# Patient Record
Sex: MALE | Race: WHITE | NOT HISPANIC OR LATINO | Employment: OTHER | URBAN - NONMETROPOLITAN AREA
[De-identification: names, ages, dates, MRNs, and addresses within clinical notes are randomized per-mention and may not be internally consistent; named-entity substitution may affect disease eponyms.]

---

## 2021-01-14 ENCOUNTER — IMMUNIZATION (OUTPATIENT)
Dept: VACCINE CLINIC | Facility: HOSPITAL | Age: 76
End: 2021-01-14

## 2021-01-14 PROCEDURE — 91300 HC SARSCOV02 VAC 30MCG/0.3ML IM: CPT | Performed by: THORACIC SURGERY (CARDIOTHORACIC VASCULAR SURGERY)

## 2021-01-14 PROCEDURE — 0001A: CPT | Performed by: THORACIC SURGERY (CARDIOTHORACIC VASCULAR SURGERY)

## 2021-02-04 ENCOUNTER — IMMUNIZATION (OUTPATIENT)
Dept: VACCINE CLINIC | Facility: HOSPITAL | Age: 76
End: 2021-02-04

## 2021-02-04 PROCEDURE — 91300 HC SARSCOV02 VAC 30MCG/0.3ML IM: CPT | Performed by: THORACIC SURGERY (CARDIOTHORACIC VASCULAR SURGERY)

## 2021-02-04 PROCEDURE — 0002A: CPT | Performed by: THORACIC SURGERY (CARDIOTHORACIC VASCULAR SURGERY)

## 2022-01-01 ENCOUNTER — HOSPITAL ENCOUNTER (OUTPATIENT)
Dept: ULTRASOUND IMAGING | Facility: HOSPITAL | Age: 77
Discharge: HOME | End: 2022-04-25
Admitting: NURSE PRACTITIONER

## 2022-01-01 ENCOUNTER — APPOINTMENT (OUTPATIENT)
Dept: MRI IMAGING | Facility: HOSPITAL | Age: 77
End: 2022-01-01

## 2022-01-01 ENCOUNTER — APPOINTMENT (OUTPATIENT)
Dept: CARDIOLOGY | Facility: HOSPITAL | Age: 77
End: 2022-01-01

## 2022-01-01 ENCOUNTER — HOSPITAL ENCOUNTER (INPATIENT)
Facility: HOSPITAL | Age: 77
LOS: 5 days | Discharge: ANOTHER HOSPITAL | End: 2022-03-08
Attending: FAMILY MEDICINE | Admitting: HOSPITALIST

## 2022-01-01 ENCOUNTER — INFUSION (OUTPATIENT)
Dept: ONCOLOGY | Facility: HOSPITAL | Age: 77
End: 2022-01-01

## 2022-01-01 ENCOUNTER — LAB (OUTPATIENT)
Dept: LAB | Facility: HOSPITAL | Age: 77
End: 2022-01-01

## 2022-01-01 ENCOUNTER — APPOINTMENT (OUTPATIENT)
Dept: CT IMAGING | Facility: HOSPITAL | Age: 77
End: 2022-01-01

## 2022-01-01 ENCOUNTER — APPOINTMENT (OUTPATIENT)
Dept: GENERAL RADIOLOGY | Facility: HOSPITAL | Age: 77
End: 2022-01-01

## 2022-01-01 ENCOUNTER — APPOINTMENT (OUTPATIENT)
Dept: ULTRASOUND IMAGING | Facility: HOSPITAL | Age: 77
End: 2022-01-01

## 2022-01-01 VITALS
SYSTOLIC BLOOD PRESSURE: 134 MMHG | RESPIRATION RATE: 18 BRPM | WEIGHT: 173.3 LBS | HEIGHT: 71 IN | OXYGEN SATURATION: 93 % | BODY MASS INDEX: 24.26 KG/M2 | DIASTOLIC BLOOD PRESSURE: 54 MMHG | HEART RATE: 99 BPM | TEMPERATURE: 96.7 F

## 2022-01-01 DIAGNOSIS — I82.621 DEEP VEIN THROMBOSIS (DVT) OF RIGHT UPPER EXTREMITY, UNSPECIFIED CHRONICITY, UNSPECIFIED VEIN: ICD-10-CM

## 2022-01-01 DIAGNOSIS — T82.9XXA COMPLICATION OF CENTRAL VENOUS CATHETER, UNSPECIFIED COMPLICATION, INITIAL ENCOUNTER: ICD-10-CM

## 2022-01-01 DIAGNOSIS — Z78.9 IMPAIRED MOBILITY AND ACTIVITIES OF DAILY LIVING: ICD-10-CM

## 2022-01-01 DIAGNOSIS — I82.90 CLOT: ICD-10-CM

## 2022-01-01 DIAGNOSIS — R78.81 MSSA BACTEREMIA: ICD-10-CM

## 2022-01-01 DIAGNOSIS — J18.9 PNEUMONIA OF RIGHT LUNG DUE TO INFECTIOUS ORGANISM, UNSPECIFIED PART OF LUNG: ICD-10-CM

## 2022-01-01 DIAGNOSIS — B95.61 MSSA BACTEREMIA: ICD-10-CM

## 2022-01-01 DIAGNOSIS — N17.9 AKI (ACUTE KIDNEY INJURY): ICD-10-CM

## 2022-01-01 DIAGNOSIS — M46.47 DISCITIS OF LUMBOSACRAL REGION: ICD-10-CM

## 2022-01-01 DIAGNOSIS — R78.81 MSSA BACTEREMIA: Primary | ICD-10-CM

## 2022-01-01 DIAGNOSIS — B95.61 MSSA BACTEREMIA: Primary | ICD-10-CM

## 2022-01-01 DIAGNOSIS — Z74.09 IMPAIRED MOBILITY AND ACTIVITIES OF DAILY LIVING: ICD-10-CM

## 2022-01-01 DIAGNOSIS — I82.90 BLOOD CLOT IN VEIN: ICD-10-CM

## 2022-01-01 DIAGNOSIS — G06.1 ABSCESS IN EPIDURAL SPACE OF LUMBAR SPINE: ICD-10-CM

## 2022-01-01 DIAGNOSIS — M62.82 NON-TRAUMATIC RHABDOMYOLYSIS: Primary | ICD-10-CM

## 2022-01-01 DIAGNOSIS — Z74.09 IMPAIRED FUNCTIONAL MOBILITY, BALANCE, GAIT, AND ENDURANCE: ICD-10-CM

## 2022-01-01 LAB
ALBUMIN SERPL-MCNC: 2 G/DL (ref 3.5–5.2)
ALBUMIN SERPL-MCNC: 2.5 G/DL (ref 3.5–5.2)
ALBUMIN SERPL-MCNC: 2.8 G/DL (ref 3.5–5.2)
ALBUMIN SERPL-MCNC: 2.9 G/DL (ref 3.5–5.2)
ALBUMIN SERPL-MCNC: 3 G/DL (ref 3.5–5.2)
ALBUMIN SERPL-MCNC: 3.2 G/DL (ref 3.5–5.2)
ALBUMIN SERPL-MCNC: 3.2 G/DL (ref 3.5–5.2)
ALBUMIN SERPL-MCNC: 3.6 G/DL (ref 3.5–5.2)
ALBUMIN/GLOB SERPL: 0.5 G/DL
ALBUMIN/GLOB SERPL: 0.7 G/DL
ALBUMIN/GLOB SERPL: 0.8 G/DL
ALBUMIN/GLOB SERPL: 0.9 G/DL
ALBUMIN/GLOB SERPL: 0.9 G/DL
ALP SERPL-CCNC: 49 U/L (ref 39–117)
ALP SERPL-CCNC: 61 U/L (ref 39–117)
ALP SERPL-CCNC: 72 U/L (ref 39–117)
ALP SERPL-CCNC: 74 U/L (ref 39–117)
ALP SERPL-CCNC: 76 U/L (ref 39–117)
ALP SERPL-CCNC: 78 U/L (ref 39–117)
ALP SERPL-CCNC: 80 U/L (ref 39–117)
ALP SERPL-CCNC: 93 U/L (ref 39–117)
ALT SERPL W P-5'-P-CCNC: 10 U/L (ref 1–41)
ALT SERPL W P-5'-P-CCNC: 17 U/L (ref 1–41)
ALT SERPL W P-5'-P-CCNC: 38 U/L (ref 1–41)
ALT SERPL W P-5'-P-CCNC: 42 U/L (ref 1–41)
ALT SERPL W P-5'-P-CCNC: 9 U/L (ref 1–41)
ALT SERPL W P-5'-P-CCNC: 9 U/L (ref 1–41)
ANION GAP SERPL CALCULATED.3IONS-SCNC: 11 MMOL/L (ref 5–15)
ANION GAP SERPL CALCULATED.3IONS-SCNC: 11 MMOL/L (ref 5–15)
ANION GAP SERPL CALCULATED.3IONS-SCNC: 13 MMOL/L (ref 5–15)
ANION GAP SERPL CALCULATED.3IONS-SCNC: 14 MMOL/L (ref 5–15)
ANION GAP SERPL CALCULATED.3IONS-SCNC: 15 MMOL/L (ref 5–15)
ANION GAP SERPL CALCULATED.3IONS-SCNC: 9 MMOL/L (ref 5–15)
ANION GAP SERPL CALCULATED.3IONS-SCNC: 9 MMOL/L (ref 5–15)
AST SERPL-CCNC: 114 U/L (ref 1–40)
AST SERPL-CCNC: 12 U/L (ref 1–40)
AST SERPL-CCNC: 12 U/L (ref 1–40)
AST SERPL-CCNC: 14 U/L (ref 1–40)
AST SERPL-CCNC: 14 U/L (ref 1–40)
AST SERPL-CCNC: 143 U/L (ref 1–40)
AST SERPL-CCNC: 16 U/L (ref 1–40)
AST SERPL-CCNC: 44 U/L (ref 1–40)
BACTERIA BLD CULT: ABNORMAL
BACTERIA SPEC AEROBE CULT: ABNORMAL
BACTERIA SPEC AEROBE CULT: NORMAL
BACTERIA UR QL AUTO: ABNORMAL /HPF
BASOPHILS # BLD AUTO: 0.06 10*3/MM3 (ref 0–0.2)
BASOPHILS # BLD AUTO: 0.07 10*3/MM3 (ref 0–0.2)
BASOPHILS # BLD AUTO: 0.08 10*3/MM3 (ref 0–0.2)
BASOPHILS # BLD AUTO: 0.08 10*3/MM3 (ref 0–0.2)
BASOPHILS # BLD AUTO: 0.1 10*3/MM3 (ref 0–0.2)
BASOPHILS # BLD AUTO: 0.11 10*3/MM3 (ref 0–0.2)
BASOPHILS # BLD AUTO: 0.11 10*3/MM3 (ref 0–0.2)
BASOPHILS # BLD AUTO: 0.12 10*3/MM3 (ref 0–0.2)
BASOPHILS # BLD AUTO: 0.14 10*3/MM3 (ref 0–0.2)
BASOPHILS NFR BLD AUTO: 0.5 % (ref 0–1.5)
BASOPHILS NFR BLD AUTO: 0.6 % (ref 0–1.5)
BASOPHILS NFR BLD AUTO: 0.8 % (ref 0–1.5)
BASOPHILS NFR BLD AUTO: 1.1 % (ref 0–1.5)
BASOPHILS NFR BLD AUTO: 1.4 % (ref 0–1.5)
BASOPHILS NFR BLD AUTO: 1.6 % (ref 0–1.5)
BASOPHILS NFR BLD AUTO: 1.6 % (ref 0–1.5)
BH CV ECHO MEAS - ACS: 2.1 CM
BH CV ECHO MEAS - AO MAX PG (FULL): 4.2 MMHG
BH CV ECHO MEAS - AO MAX PG: 11 MMHG
BH CV ECHO MEAS - AO MEAN PG (FULL): 3 MMHG
BH CV ECHO MEAS - AO MEAN PG: 6 MMHG
BH CV ECHO MEAS - AO ROOT AREA (BSA CORRECTED): 1.9
BH CV ECHO MEAS - AO ROOT AREA: 10.8 CM^2
BH CV ECHO MEAS - AO ROOT DIAM: 3.7 CM
BH CV ECHO MEAS - AO V2 MAX: 166 CM/SEC
BH CV ECHO MEAS - AO V2 MEAN: 114 CM/SEC
BH CV ECHO MEAS - AO V2 VTI: 21.9 CM
BH CV ECHO MEAS - ASC AORTA: 3.7 CM
BH CV ECHO MEAS - AVA(I,A): 4 CM^2
BH CV ECHO MEAS - AVA(I,D): 4 CM^2
BH CV ECHO MEAS - AVA(V,A): 3 CM^2
BH CV ECHO MEAS - AVA(V,D): 3 CM^2
BH CV ECHO MEAS - BSA(HAYCOCK): 1.9 M^2
BH CV ECHO MEAS - BSA: 2 M^2
BH CV ECHO MEAS - BZI_BMI: 23.3 KILOGRAMS/M^2
BH CV ECHO MEAS - BZI_METRIC_HEIGHT: 180.3 CM
BH CV ECHO MEAS - BZI_METRIC_WEIGHT: 75.8 KG
BH CV ECHO MEAS - EDV(CUBED): 81.7 ML
BH CV ECHO MEAS - EDV(MOD-SP2): 60.5 ML
BH CV ECHO MEAS - EDV(MOD-SP4): 97.6 ML
BH CV ECHO MEAS - EDV(TEICH): 84.9 ML
BH CV ECHO MEAS - EF(CUBED): 80.4 %
BH CV ECHO MEAS - EF(MOD-SP2): 53.1 %
BH CV ECHO MEAS - EF(MOD-SP4): 63.2 %
BH CV ECHO MEAS - EF(TEICH): 73.2 %
BH CV ECHO MEAS - ESV(CUBED): 16 ML
BH CV ECHO MEAS - ESV(MOD-SP2): 28.4 ML
BH CV ECHO MEAS - ESV(MOD-SP4): 35.9 ML
BH CV ECHO MEAS - ESV(TEICH): 22.8 ML
BH CV ECHO MEAS - FS: 41.9 %
BH CV ECHO MEAS - IVS/LVPW: 1.2
BH CV ECHO MEAS - IVSD: 1.6 CM
BH CV ECHO MEAS - LA DIMENSION: 3.7 CM
BH CV ECHO MEAS - LA/AO: 1
BH CV ECHO MEAS - LV DIASTOLIC VOL/BSA (35-75): 50 ML/M^2
BH CV ECHO MEAS - LV MASS(C)D: 251 GRAMS
BH CV ECHO MEAS - LV MASS(C)DI: 128.5 GRAMS/M^2
BH CV ECHO MEAS - LV MAX PG: 6.9 MMHG
BH CV ECHO MEAS - LV MEAN PG: 3 MMHG
BH CV ECHO MEAS - LV SYSTOLIC VOL/BSA (12-30): 18.4 ML/M^2
BH CV ECHO MEAS - LV V1 MAX: 131 CM/SEC
BH CV ECHO MEAS - LV V1 MEAN: 84.8 CM/SEC
BH CV ECHO MEAS - LV V1 VTI: 23.2 CM
BH CV ECHO MEAS - LVIDD: 4.3 CM
BH CV ECHO MEAS - LVIDS: 2.5 CM
BH CV ECHO MEAS - LVLD AP2: 7.5 CM
BH CV ECHO MEAS - LVLD AP4: 7.5 CM
BH CV ECHO MEAS - LVLS AP2: 6 CM
BH CV ECHO MEAS - LVLS AP4: 5.8 CM
BH CV ECHO MEAS - LVOT AREA (M): 3.8 CM^2
BH CV ECHO MEAS - LVOT AREA: 3.8 CM^2
BH CV ECHO MEAS - LVOT DIAM: 2.2 CM
BH CV ECHO MEAS - LVPWD: 1.3 CM
BH CV ECHO MEAS - MR MAX PG: 18.5 MMHG
BH CV ECHO MEAS - MR MAX VEL: 215 CM/SEC
BH CV ECHO MEAS - MV A MAX VEL: 80.1 CM/SEC
BH CV ECHO MEAS - MV DEC SLOPE: 283 CM/SEC^2
BH CV ECHO MEAS - MV E MAX VEL: 59.1 CM/SEC
BH CV ECHO MEAS - MV E/A: 0.74
BH CV ECHO MEAS - MV MAX PG: 3 MMHG
BH CV ECHO MEAS - MV MEAN PG: 1 MMHG
BH CV ECHO MEAS - MV P1/2T MAX VEL: 61.9 CM/SEC
BH CV ECHO MEAS - MV P1/2T: 64.1 MSEC
BH CV ECHO MEAS - MV V2 MAX: 87.3 CM/SEC
BH CV ECHO MEAS - MV V2 MEAN: 46 CM/SEC
BH CV ECHO MEAS - MV V2 VTI: 18.4 CM
BH CV ECHO MEAS - MVA P1/2T LCG: 3.6 CM^2
BH CV ECHO MEAS - MVA(P1/2T): 3.4 CM^2
BH CV ECHO MEAS - MVA(VTI): 4.8 CM^2
BH CV ECHO MEAS - PA MAX PG: 3.4 MMHG
BH CV ECHO MEAS - PA V2 MAX: 92.3 CM/SEC
BH CV ECHO MEAS - RAP SYSTOLE: 10 MMHG
BH CV ECHO MEAS - RVDD: 2.6 CM
BH CV ECHO MEAS - RVSP: 38.5 MMHG
BH CV ECHO MEAS - SI(AO): 120.5 ML/M^2
BH CV ECHO MEAS - SI(CUBED): 33.7 ML/M^2
BH CV ECHO MEAS - SI(LVOT): 45.1 ML/M^2
BH CV ECHO MEAS - SI(MOD-SP2): 16.4 ML/M^2
BH CV ECHO MEAS - SI(MOD-SP4): 31.6 ML/M^2
BH CV ECHO MEAS - SI(TEICH): 31.8 ML/M^2
BH CV ECHO MEAS - SV(AO): 235.5 ML
BH CV ECHO MEAS - SV(CUBED): 65.7 ML
BH CV ECHO MEAS - SV(LVOT): 88.2 ML
BH CV ECHO MEAS - SV(MOD-SP2): 32.1 ML
BH CV ECHO MEAS - SV(MOD-SP4): 61.7 ML
BH CV ECHO MEAS - SV(TEICH): 62.1 ML
BH CV ECHO MEAS - TR MAX VEL: 267 CM/SEC
BILIRUB SERPL-MCNC: 0.4 MG/DL (ref 0–1.2)
BILIRUB SERPL-MCNC: 0.5 MG/DL (ref 0–1.2)
BILIRUB SERPL-MCNC: 0.6 MG/DL (ref 0–1.2)
BILIRUB SERPL-MCNC: 1.2 MG/DL (ref 0–1.2)
BILIRUB UR QL STRIP: ABNORMAL
BUN SERPL-MCNC: 10 MG/DL (ref 8–23)
BUN SERPL-MCNC: 12 MG/DL (ref 8–23)
BUN SERPL-MCNC: 12 MG/DL (ref 8–23)
BUN SERPL-MCNC: 14 MG/DL (ref 8–23)
BUN SERPL-MCNC: 18 MG/DL (ref 8–23)
BUN SERPL-MCNC: 23 MG/DL (ref 8–23)
BUN SERPL-MCNC: 36 MG/DL (ref 8–23)
BUN SERPL-MCNC: 37 MG/DL (ref 8–23)
BUN SERPL-MCNC: 7 MG/DL (ref 8–23)
BUN/CREAT SERPL: 12.8 (ref 7–25)
BUN/CREAT SERPL: 13 (ref 7–25)
BUN/CREAT SERPL: 15.2 (ref 7–25)
BUN/CREAT SERPL: 19.2 (ref 7–25)
BUN/CREAT SERPL: 23.4 (ref 7–25)
BUN/CREAT SERPL: 25.5 (ref 7–25)
BUN/CREAT SERPL: 32.9 (ref 7–25)
BUN/CREAT SERPL: 34 (ref 7–25)
BUN/CREAT SERPL: 9.3 (ref 7–25)
CALCIUM SPEC-SCNC: 7.8 MG/DL (ref 8.6–10.5)
CALCIUM SPEC-SCNC: 7.9 MG/DL (ref 8.6–10.5)
CALCIUM SPEC-SCNC: 8.1 MG/DL (ref 8.6–10.5)
CALCIUM SPEC-SCNC: 8.2 MG/DL (ref 8.6–10.5)
CALCIUM SPEC-SCNC: 8.4 MG/DL (ref 8.6–10.5)
CALCIUM SPEC-SCNC: 8.5 MG/DL (ref 8.6–10.5)
CALCIUM SPEC-SCNC: 8.6 MG/DL (ref 8.6–10.5)
CALCIUM SPEC-SCNC: 8.9 MG/DL (ref 8.6–10.5)
CALCIUM SPEC-SCNC: 9.7 MG/DL (ref 8.6–10.5)
CHLORIDE SERPL-SCNC: 101 MMOL/L (ref 98–107)
CHLORIDE SERPL-SCNC: 102 MMOL/L (ref 98–107)
CHLORIDE SERPL-SCNC: 103 MMOL/L (ref 98–107)
CHLORIDE SERPL-SCNC: 103 MMOL/L (ref 98–107)
CHLORIDE SERPL-SCNC: 105 MMOL/L (ref 98–107)
CHLORIDE SERPL-SCNC: 105 MMOL/L (ref 98–107)
CHLORIDE SERPL-SCNC: 108 MMOL/L (ref 98–107)
CHLORIDE SERPL-SCNC: 95 MMOL/L (ref 98–107)
CHLORIDE SERPL-SCNC: 97 MMOL/L (ref 98–107)
CK SERPL-CCNC: 3811 U/L (ref 20–200)
CK SERPL-CCNC: 74 U/L (ref 20–200)
CK SERPL-CCNC: 7773 U/L (ref 20–200)
CLARITY UR: ABNORMAL
CO2 SERPL-SCNC: 17 MMOL/L (ref 22–29)
CO2 SERPL-SCNC: 21 MMOL/L (ref 22–29)
CO2 SERPL-SCNC: 21 MMOL/L (ref 22–29)
CO2 SERPL-SCNC: 22 MMOL/L (ref 22–29)
CO2 SERPL-SCNC: 23 MMOL/L (ref 22–29)
CO2 SERPL-SCNC: 24 MMOL/L (ref 22–29)
CO2 SERPL-SCNC: 26 MMOL/L (ref 22–29)
COLOR UR: ABNORMAL
CREAT SERPL-MCNC: 0.7 MG/DL (ref 0.76–1.27)
CREAT SERPL-MCNC: 0.73 MG/DL (ref 0.76–1.27)
CREAT SERPL-MCNC: 0.75 MG/DL (ref 0.76–1.27)
CREAT SERPL-MCNC: 0.77 MG/DL (ref 0.76–1.27)
CREAT SERPL-MCNC: 0.77 MG/DL (ref 0.76–1.27)
CREAT SERPL-MCNC: 0.79 MG/DL (ref 0.76–1.27)
CREAT SERPL-MCNC: 0.94 MG/DL (ref 0.76–1.27)
CREAT SERPL-MCNC: 1.06 MG/DL (ref 0.76–1.27)
CREAT SERPL-MCNC: 1.45 MG/DL (ref 0.76–1.27)
CRP SERPL-MCNC: 2.63 MG/DL (ref 0–0.5)
CRP SERPL-MCNC: 3.48 MG/DL (ref 0–0.5)
CRP SERPL-MCNC: 4.85 MG/DL (ref 0–0.5)
CRP SERPL-MCNC: 5.3 MG/DL (ref 0–0.5)
CRP SERPL-MCNC: 5.5 MG/DL (ref 0–0.5)
D-LACTATE SERPL-SCNC: 1.8 MMOL/L (ref 0.5–2)
DEPRECATED RDW RBC AUTO: 42.1 FL (ref 37–54)
DEPRECATED RDW RBC AUTO: 42.5 FL (ref 37–54)
DEPRECATED RDW RBC AUTO: 46.4 FL (ref 37–54)
DEPRECATED RDW RBC AUTO: 47.5 FL (ref 37–54)
DEPRECATED RDW RBC AUTO: 60.7 FL (ref 37–54)
DEPRECATED RDW RBC AUTO: 63.2 FL (ref 37–54)
DEPRECATED RDW RBC AUTO: 65.5 FL (ref 37–54)
DEPRECATED RDW RBC AUTO: 65.7 FL (ref 37–54)
DEPRECATED RDW RBC AUTO: 67.3 FL (ref 37–54)
EGFRCR SERPLBLD CKD-EPI 2021: 49.6 ML/MIN/1.73
EGFRCR SERPLBLD CKD-EPI 2021: 72.3 ML/MIN/1.73
EGFRCR SERPLBLD CKD-EPI 2021: 83.5 ML/MIN/1.73
EGFRCR SERPLBLD CKD-EPI 2021: 91.5 ML/MIN/1.73
EGFRCR SERPLBLD CKD-EPI 2021: 92.2 ML/MIN/1.73
EGFRCR SERPLBLD CKD-EPI 2021: 92.2 ML/MIN/1.73
EGFRCR SERPLBLD CKD-EPI 2021: 92.9 ML/MIN/1.73
EGFRCR SERPLBLD CKD-EPI 2021: 93.7 ML/MIN/1.73
EGFRCR SERPLBLD CKD-EPI 2021: 94.9 ML/MIN/1.73
EOSINOPHIL # BLD AUTO: 0 10*3/MM3 (ref 0–0.4)
EOSINOPHIL # BLD AUTO: 0.01 10*3/MM3 (ref 0–0.4)
EOSINOPHIL # BLD AUTO: 0.1 10*3/MM3 (ref 0–0.4)
EOSINOPHIL # BLD AUTO: 0.11 10*3/MM3 (ref 0–0.4)
EOSINOPHIL # BLD AUTO: 0.21 10*3/MM3 (ref 0–0.4)
EOSINOPHIL # BLD AUTO: 0.21 10*3/MM3 (ref 0–0.4)
EOSINOPHIL # BLD AUTO: 0.3 10*3/MM3 (ref 0–0.4)
EOSINOPHIL # BLD AUTO: 0.31 10*3/MM3 (ref 0–0.4)
EOSINOPHIL # BLD AUTO: 0.39 10*3/MM3 (ref 0–0.4)
EOSINOPHIL NFR BLD AUTO: 0 % (ref 0.3–6.2)
EOSINOPHIL NFR BLD AUTO: 0.1 % (ref 0.3–6.2)
EOSINOPHIL NFR BLD AUTO: 0.6 % (ref 0.3–6.2)
EOSINOPHIL NFR BLD AUTO: 1 % (ref 0.3–6.2)
EOSINOPHIL NFR BLD AUTO: 2.2 % (ref 0.3–6.2)
EOSINOPHIL NFR BLD AUTO: 2.7 % (ref 0.3–6.2)
EOSINOPHIL NFR BLD AUTO: 3 % (ref 0.3–6.2)
EOSINOPHIL NFR BLD AUTO: 4 % (ref 0.3–6.2)
EOSINOPHIL NFR BLD AUTO: 4.9 % (ref 0.3–6.2)
ERYTHROCYTE [DISTWIDTH] IN BLOOD BY AUTOMATED COUNT: 13.3 % (ref 12.3–15.4)
ERYTHROCYTE [DISTWIDTH] IN BLOOD BY AUTOMATED COUNT: 13.4 % (ref 12.3–15.4)
ERYTHROCYTE [DISTWIDTH] IN BLOOD BY AUTOMATED COUNT: 13.8 % (ref 12.3–15.4)
ERYTHROCYTE [DISTWIDTH] IN BLOOD BY AUTOMATED COUNT: 14.3 % (ref 12.3–15.4)
ERYTHROCYTE [DISTWIDTH] IN BLOOD BY AUTOMATED COUNT: 18.4 % (ref 12.3–15.4)
ERYTHROCYTE [DISTWIDTH] IN BLOOD BY AUTOMATED COUNT: 18.9 % (ref 12.3–15.4)
ERYTHROCYTE [DISTWIDTH] IN BLOOD BY AUTOMATED COUNT: 19.1 % (ref 12.3–15.4)
ERYTHROCYTE [DISTWIDTH] IN BLOOD BY AUTOMATED COUNT: 19.9 % (ref 12.3–15.4)
ERYTHROCYTE [DISTWIDTH] IN BLOOD BY AUTOMATED COUNT: 19.9 % (ref 12.3–15.4)
ERYTHROCYTE [SEDIMENTATION RATE] IN BLOOD: 109 MM/HR (ref 0–15)
ERYTHROCYTE [SEDIMENTATION RATE] IN BLOOD: 113 MM/HR (ref 0–15)
ERYTHROCYTE [SEDIMENTATION RATE] IN BLOOD: 117 MM/HR (ref 0–15)
ERYTHROCYTE [SEDIMENTATION RATE] IN BLOOD: 118 MM/HR (ref 0–15)
ERYTHROCYTE [SEDIMENTATION RATE] IN BLOOD: 84 MM/HR (ref 0–15)
FLUAV SUBTYP SPEC NAA+PROBE: NOT DETECTED
FLUBV RNA ISLT QL NAA+PROBE: NOT DETECTED
GLOBULIN UR ELPH-MCNC: 3.4 GM/DL
GLOBULIN UR ELPH-MCNC: 3.6 GM/DL
GLOBULIN UR ELPH-MCNC: 3.8 GM/DL
GLOBULIN UR ELPH-MCNC: 4 GM/DL
GLOBULIN UR ELPH-MCNC: 4 GM/DL
GLOBULIN UR ELPH-MCNC: 4.1 GM/DL
GLOBULIN UR ELPH-MCNC: 4.1 GM/DL
GLOBULIN UR ELPH-MCNC: 4.2 GM/DL
GLUCOSE SERPL-MCNC: 105 MG/DL (ref 65–99)
GLUCOSE SERPL-MCNC: 120 MG/DL (ref 65–99)
GLUCOSE SERPL-MCNC: 124 MG/DL (ref 65–99)
GLUCOSE SERPL-MCNC: 126 MG/DL (ref 65–99)
GLUCOSE SERPL-MCNC: 148 MG/DL (ref 65–99)
GLUCOSE SERPL-MCNC: 77 MG/DL (ref 65–99)
GLUCOSE SERPL-MCNC: 90 MG/DL (ref 65–99)
GLUCOSE SERPL-MCNC: 97 MG/DL (ref 65–99)
GLUCOSE SERPL-MCNC: 98 MG/DL (ref 65–99)
GLUCOSE UR STRIP-MCNC: NEGATIVE MG/DL
GRAM STN SPEC: ABNORMAL
GRAN CASTS URNS QL MICRO: ABNORMAL /LPF
HCT VFR BLD AUTO: 31.5 % (ref 37.5–51)
HCT VFR BLD AUTO: 31.8 % (ref 37.5–51)
HCT VFR BLD AUTO: 31.9 % (ref 37.5–51)
HCT VFR BLD AUTO: 32.9 % (ref 37.5–51)
HCT VFR BLD AUTO: 36.6 % (ref 37.5–51)
HCT VFR BLD AUTO: 38.4 % (ref 37.5–51)
HCT VFR BLD AUTO: 38.8 % (ref 37.5–51)
HCT VFR BLD AUTO: 40.6 % (ref 37.5–51)
HCT VFR BLD AUTO: 47.1 % (ref 37.5–51)
HGB BLD-MCNC: 10.4 G/DL (ref 13–17.7)
HGB BLD-MCNC: 10.4 G/DL (ref 13–17.7)
HGB BLD-MCNC: 10.7 G/DL (ref 13–17.7)
HGB BLD-MCNC: 10.8 G/DL (ref 13–17.7)
HGB BLD-MCNC: 12.4 G/DL (ref 13–17.7)
HGB BLD-MCNC: 12.8 G/DL (ref 13–17.7)
HGB BLD-MCNC: 13.4 G/DL (ref 13–17.7)
HGB BLD-MCNC: 13.6 G/DL (ref 13–17.7)
HGB BLD-MCNC: 16 G/DL (ref 13–17.7)
HGB UR QL STRIP.AUTO: ABNORMAL
HOLD SPECIMEN: NORMAL
HYALINE CASTS UR QL AUTO: ABNORMAL /LPF
IMM GRANULOCYTES # BLD AUTO: 0.03 10*3/MM3 (ref 0–0.05)
IMM GRANULOCYTES # BLD AUTO: 0.04 10*3/MM3 (ref 0–0.05)
IMM GRANULOCYTES # BLD AUTO: 0.05 10*3/MM3 (ref 0–0.05)
IMM GRANULOCYTES # BLD AUTO: 0.05 10*3/MM3 (ref 0–0.05)
IMM GRANULOCYTES # BLD AUTO: 0.07 10*3/MM3 (ref 0–0.05)
IMM GRANULOCYTES # BLD AUTO: 0.1 10*3/MM3 (ref 0–0.05)
IMM GRANULOCYTES # BLD AUTO: 0.15 10*3/MM3 (ref 0–0.05)
IMM GRANULOCYTES # BLD AUTO: 0.16 10*3/MM3 (ref 0–0.05)
IMM GRANULOCYTES # BLD AUTO: 0.67 10*3/MM3 (ref 0–0.05)
IMM GRANULOCYTES NFR BLD AUTO: 0.4 % (ref 0–0.5)
IMM GRANULOCYTES NFR BLD AUTO: 0.5 % (ref 0–0.5)
IMM GRANULOCYTES NFR BLD AUTO: 0.7 % (ref 0–0.5)
IMM GRANULOCYTES NFR BLD AUTO: 0.7 % (ref 0–0.5)
IMM GRANULOCYTES NFR BLD AUTO: 0.8 % (ref 0–0.5)
IMM GRANULOCYTES NFR BLD AUTO: 1 % (ref 0–0.5)
IMM GRANULOCYTES NFR BLD AUTO: 1.1 % (ref 0–0.5)
IMM GRANULOCYTES NFR BLD AUTO: 1.2 % (ref 0–0.5)
IMM GRANULOCYTES NFR BLD AUTO: 3.7 % (ref 0–0.5)
ISOLATED FROM: ABNORMAL
KETONES UR QL STRIP: ABNORMAL
LEUKOCYTE ESTERASE UR QL STRIP.AUTO: ABNORMAL
LYMPHOCYTES # BLD AUTO: 0.28 10*3/MM3 (ref 0.7–3.1)
LYMPHOCYTES # BLD AUTO: 0.3 10*3/MM3 (ref 0.7–3.1)
LYMPHOCYTES # BLD AUTO: 0.46 10*3/MM3 (ref 0.7–3.1)
LYMPHOCYTES # BLD AUTO: 0.53 10*3/MM3 (ref 0.7–3.1)
LYMPHOCYTES # BLD AUTO: 0.74 10*3/MM3 (ref 0.7–3.1)
LYMPHOCYTES # BLD AUTO: 0.85 10*3/MM3 (ref 0.7–3.1)
LYMPHOCYTES # BLD AUTO: 0.88 10*3/MM3 (ref 0.7–3.1)
LYMPHOCYTES # BLD AUTO: 0.89 10*3/MM3 (ref 0.7–3.1)
LYMPHOCYTES # BLD AUTO: 1.43 10*3/MM3 (ref 0.7–3.1)
LYMPHOCYTES NFR BLD AUTO: 11.1 % (ref 19.6–45.3)
LYMPHOCYTES NFR BLD AUTO: 11.6 % (ref 19.6–45.3)
LYMPHOCYTES NFR BLD AUTO: 12.1 % (ref 19.6–45.3)
LYMPHOCYTES NFR BLD AUTO: 14.5 % (ref 19.6–45.3)
LYMPHOCYTES NFR BLD AUTO: 2.2 % (ref 19.6–45.3)
LYMPHOCYTES NFR BLD AUTO: 2.9 % (ref 19.6–45.3)
LYMPHOCYTES NFR BLD AUTO: 3.2 % (ref 19.6–45.3)
LYMPHOCYTES NFR BLD AUTO: 4.1 % (ref 19.6–45.3)
LYMPHOCYTES NFR BLD AUTO: 9.2 % (ref 19.6–45.3)
MAGNESIUM SERPL-MCNC: 2.1 MG/DL (ref 1.6–2.4)
MAGNESIUM SERPL-MCNC: 2.1 MG/DL (ref 1.6–2.4)
MAXIMAL PREDICTED HEART RATE: 143 BPM
MCH RBC QN AUTO: 29.9 PG (ref 26.6–33)
MCH RBC QN AUTO: 30 PG (ref 26.6–33)
MCH RBC QN AUTO: 30.1 PG (ref 26.6–33)
MCH RBC QN AUTO: 30.3 PG (ref 26.6–33)
MCH RBC QN AUTO: 30.4 PG (ref 26.6–33)
MCH RBC QN AUTO: 30.4 PG (ref 26.6–33)
MCH RBC QN AUTO: 30.9 PG (ref 26.6–33)
MCH RBC QN AUTO: 31 PG (ref 26.6–33)
MCH RBC QN AUTO: 31.3 PG (ref 26.6–33)
MCHC RBC AUTO-ENTMCNC: 32.3 G/DL (ref 31.5–35.7)
MCHC RBC AUTO-ENTMCNC: 32.5 G/DL (ref 31.5–35.7)
MCHC RBC AUTO-ENTMCNC: 32.6 G/DL (ref 31.5–35.7)
MCHC RBC AUTO-ENTMCNC: 33 G/DL (ref 31.5–35.7)
MCHC RBC AUTO-ENTMCNC: 33 G/DL (ref 31.5–35.7)
MCHC RBC AUTO-ENTMCNC: 34 G/DL (ref 31.5–35.7)
MCHC RBC AUTO-ENTMCNC: 34 G/DL (ref 31.5–35.7)
MCHC RBC AUTO-ENTMCNC: 35 G/DL (ref 31.5–35.7)
MCHC RBC AUTO-ENTMCNC: 35.1 G/DL (ref 31.5–35.7)
MCV RBC AUTO: 86.7 FL (ref 79–97)
MCV RBC AUTO: 86.8 FL (ref 79–97)
MCV RBC AUTO: 90.8 FL (ref 79–97)
MCV RBC AUTO: 90.9 FL (ref 79–97)
MCV RBC AUTO: 91.3 FL (ref 79–97)
MCV RBC AUTO: 91.9 FL (ref 79–97)
MCV RBC AUTO: 92.2 FL (ref 79–97)
MCV RBC AUTO: 93.3 FL (ref 79–97)
MCV RBC AUTO: 96 FL (ref 79–97)
MONOCYTES # BLD AUTO: 0.57 10*3/MM3 (ref 0.1–0.9)
MONOCYTES # BLD AUTO: 0.59 10*3/MM3 (ref 0.1–0.9)
MONOCYTES # BLD AUTO: 0.63 10*3/MM3 (ref 0.1–0.9)
MONOCYTES # BLD AUTO: 0.66 10*3/MM3 (ref 0.1–0.9)
MONOCYTES # BLD AUTO: 0.73 10*3/MM3 (ref 0.1–0.9)
MONOCYTES # BLD AUTO: 0.81 10*3/MM3 (ref 0.1–0.9)
MONOCYTES # BLD AUTO: 0.84 10*3/MM3 (ref 0.1–0.9)
MONOCYTES # BLD AUTO: 0.88 10*3/MM3 (ref 0.1–0.9)
MONOCYTES # BLD AUTO: 0.9 10*3/MM3 (ref 0.1–0.9)
MONOCYTES NFR BLD AUTO: 4.9 % (ref 5–12)
MONOCYTES NFR BLD AUTO: 5.7 % (ref 5–12)
MONOCYTES NFR BLD AUTO: 5.7 % (ref 5–12)
MONOCYTES NFR BLD AUTO: 7.8 % (ref 5–12)
MONOCYTES NFR BLD AUTO: 8.2 % (ref 5–12)
MONOCYTES NFR BLD AUTO: 8.6 % (ref 5–12)
MONOCYTES NFR BLD AUTO: 8.8 % (ref 5–12)
MONOCYTES NFR BLD AUTO: 8.9 % (ref 5–12)
MONOCYTES NFR BLD AUTO: 9.7 % (ref 5–12)
NEUTROPHILS NFR BLD AUTO: 11.19 10*3/MM3 (ref 1.7–7)
NEUTROPHILS NFR BLD AUTO: 12.77 10*3/MM3 (ref 1.7–7)
NEUTROPHILS NFR BLD AUTO: 15.92 10*3/MM3 (ref 1.7–7)
NEUTROPHILS NFR BLD AUTO: 4.33 10*3/MM3 (ref 1.7–7)
NEUTROPHILS NFR BLD AUTO: 5.7 10*3/MM3 (ref 1.7–7)
NEUTROPHILS NFR BLD AUTO: 5.8 10*3/MM3 (ref 1.7–7)
NEUTROPHILS NFR BLD AUTO: 6.38 10*3/MM3 (ref 1.7–7)
NEUTROPHILS NFR BLD AUTO: 7.25 10*3/MM3 (ref 1.7–7)
NEUTROPHILS NFR BLD AUTO: 7.51 10*3/MM3 (ref 1.7–7)
NEUTROPHILS NFR BLD AUTO: 70.9 % (ref 42.7–76)
NEUTROPHILS NFR BLD AUTO: 73.5 % (ref 42.7–76)
NEUTROPHILS NFR BLD AUTO: 74.2 % (ref 42.7–76)
NEUTROPHILS NFR BLD AUTO: 75.4 % (ref 42.7–76)
NEUTROPHILS NFR BLD AUTO: 78.3 % (ref 42.7–76)
NEUTROPHILS NFR BLD AUTO: 86.8 % (ref 42.7–76)
NEUTROPHILS NFR BLD AUTO: 87.1 % (ref 42.7–76)
NEUTROPHILS NFR BLD AUTO: 87.4 % (ref 42.7–76)
NEUTROPHILS NFR BLD AUTO: 89.4 % (ref 42.7–76)
NITRITE UR QL STRIP: NEGATIVE
NRBC BLD AUTO-RTO: 0 /100 WBC (ref 0–0.2)
NT-PROBNP SERPL-MCNC: 1633 PG/ML (ref 0–1800)
PH UR STRIP.AUTO: <=5 [PH] (ref 5–9)
PHOSPHATE SERPL-MCNC: 2.5 MG/DL (ref 2.5–4.5)
PLATELET # BLD AUTO: 118 10*3/MM3 (ref 140–450)
PLATELET # BLD AUTO: 118 10*3/MM3 (ref 140–450)
PLATELET # BLD AUTO: 149 10*3/MM3 (ref 140–450)
PLATELET # BLD AUTO: 195 10*3/MM3 (ref 140–450)
PLATELET # BLD AUTO: 325 10*3/MM3 (ref 140–450)
PLATELET # BLD AUTO: 329 10*3/MM3 (ref 140–450)
PLATELET # BLD AUTO: 331 10*3/MM3 (ref 140–450)
PLATELET # BLD AUTO: 333 10*3/MM3 (ref 140–450)
PLATELET # BLD AUTO: 386 10*3/MM3 (ref 140–450)
PMV BLD AUTO: 10.8 FL (ref 6–12)
PMV BLD AUTO: 11.1 FL (ref 6–12)
PMV BLD AUTO: 11.4 FL (ref 6–12)
PMV BLD AUTO: 11.8 FL (ref 6–12)
PMV BLD AUTO: 9.1 FL (ref 6–12)
PMV BLD AUTO: 9.2 FL (ref 6–12)
PMV BLD AUTO: 9.3 FL (ref 6–12)
PMV BLD AUTO: 9.4 FL (ref 6–12)
PMV BLD AUTO: 9.8 FL (ref 6–12)
POTASSIUM SERPL-SCNC: 3 MMOL/L (ref 3.5–5.2)
POTASSIUM SERPL-SCNC: 3.2 MMOL/L (ref 3.5–5.2)
POTASSIUM SERPL-SCNC: 3.4 MMOL/L (ref 3.5–5.2)
POTASSIUM SERPL-SCNC: 3.7 MMOL/L (ref 3.5–5.2)
POTASSIUM SERPL-SCNC: 3.7 MMOL/L (ref 3.5–5.2)
POTASSIUM SERPL-SCNC: 3.9 MMOL/L (ref 3.5–5.2)
POTASSIUM SERPL-SCNC: 4 MMOL/L (ref 3.5–5.2)
POTASSIUM SERPL-SCNC: 4.1 MMOL/L (ref 3.5–5.2)
POTASSIUM SERPL-SCNC: 4.4 MMOL/L (ref 3.5–5.2)
PROT SERPL-MCNC: 5.8 G/DL (ref 6–8.5)
PROT SERPL-MCNC: 5.9 G/DL (ref 6–8.5)
PROT SERPL-MCNC: 6.8 G/DL (ref 6–8.5)
PROT SERPL-MCNC: 7 G/DL (ref 6–8.5)
PROT SERPL-MCNC: 7 G/DL (ref 6–8.5)
PROT SERPL-MCNC: 7.1 G/DL (ref 6–8.5)
PROT SERPL-MCNC: 7.2 G/DL (ref 6–8.5)
PROT SERPL-MCNC: 7.6 G/DL (ref 6–8.5)
PROT UR QL STRIP: ABNORMAL
QT INTERVAL: 320 MS
QTC INTERVAL: 464 MS
RBC # BLD AUTO: 3.42 10*6/MM3 (ref 4.14–5.8)
RBC # BLD AUTO: 3.45 10*6/MM3 (ref 4.14–5.8)
RBC # BLD AUTO: 3.45 10*6/MM3 (ref 4.14–5.8)
RBC # BLD AUTO: 3.58 10*6/MM3 (ref 4.14–5.8)
RBC # BLD AUTO: 4 10*6/MM3 (ref 4.14–5.8)
RBC # BLD AUTO: 4.22 10*6/MM3 (ref 4.14–5.8)
RBC # BLD AUTO: 4.47 10*6/MM3 (ref 4.14–5.8)
RBC # BLD AUTO: 4.47 10*6/MM3 (ref 4.14–5.8)
RBC # BLD AUTO: 5.18 10*6/MM3 (ref 4.14–5.8)
RBC # UR STRIP: ABNORMAL /HPF
REF LAB TEST METHOD: ABNORMAL
SARS-COV-2 RNA PNL SPEC NAA+PROBE: NOT DETECTED
SODIUM SERPL-SCNC: 129 MMOL/L (ref 136–145)
SODIUM SERPL-SCNC: 132 MMOL/L (ref 136–145)
SODIUM SERPL-SCNC: 134 MMOL/L (ref 136–145)
SODIUM SERPL-SCNC: 137 MMOL/L (ref 136–145)
SODIUM SERPL-SCNC: 139 MMOL/L (ref 136–145)
SODIUM SERPL-SCNC: 141 MMOL/L (ref 136–145)
SODIUM SERPL-SCNC: 141 MMOL/L (ref 136–145)
SP GR UR STRIP: 1.02 (ref 1–1.03)
SQUAMOUS #/AREA URNS HPF: ABNORMAL /HPF
STRESS TARGET HR: 122 BPM
TROPONIN T SERPL-MCNC: <0.01 NG/ML (ref 0–0.03)
UROBILINOGEN UR QL STRIP: ABNORMAL
WBC # UR STRIP: ABNORMAL /HPF
WBC NRBC COR # BLD: 12.82 10*3/MM3 (ref 3.4–10.8)
WBC NRBC COR # BLD: 14.27 10*3/MM3 (ref 3.4–10.8)
WBC NRBC COR # BLD: 18.27 10*3/MM3 (ref 3.4–10.8)
WBC NRBC COR # BLD: 6.11 10*3/MM3 (ref 3.4–10.8)
WBC NRBC COR # BLD: 7.35 10*3/MM3 (ref 3.4–10.8)
WBC NRBC COR # BLD: 7.68 10*3/MM3 (ref 3.4–10.8)
WBC NRBC COR # BLD: 7.69 10*3/MM3 (ref 3.4–10.8)
WBC NRBC COR # BLD: 9.59 10*3/MM3 (ref 3.4–10.8)
WBC NRBC COR # BLD: 9.87 10*3/MM3 (ref 3.4–10.8)
WHOLE BLOOD HOLD SPECIMEN: NORMAL
WHOLE BLOOD HOLD SPECIMEN: NORMAL

## 2022-01-01 PROCEDURE — 25010000002 CEFTRIAXONE PER 250 MG: Performed by: FAMILY MEDICINE

## 2022-01-01 PROCEDURE — 86140 C-REACTIVE PROTEIN: CPT

## 2022-01-01 PROCEDURE — 82550 ASSAY OF CK (CPK): CPT | Performed by: INTERNAL MEDICINE

## 2022-01-01 PROCEDURE — 25010000002 AZITHROMYCIN PER 500 MG: Performed by: FAMILY MEDICINE

## 2022-01-01 PROCEDURE — 80053 COMPREHEN METABOLIC PANEL: CPT

## 2022-01-01 PROCEDURE — 84484 ASSAY OF TROPONIN QUANT: CPT | Performed by: INTERNAL MEDICINE

## 2022-01-01 PROCEDURE — 87147 CULTURE TYPE IMMUNOLOGIC: CPT | Performed by: INTERNAL MEDICINE

## 2022-01-01 PROCEDURE — 36592 COLLECT BLOOD FROM PICC: CPT | Performed by: NURSE PRACTITIONER

## 2022-01-01 PROCEDURE — 82550 ASSAY OF CK (CPK): CPT | Performed by: FAMILY MEDICINE

## 2022-01-01 PROCEDURE — 71260 CT THORAX DX C+: CPT

## 2022-01-01 PROCEDURE — 80053 COMPREHEN METABOLIC PANEL: CPT | Performed by: INTERNAL MEDICINE

## 2022-01-01 PROCEDURE — 84100 ASSAY OF PHOSPHORUS: CPT | Performed by: INTERNAL MEDICINE

## 2022-01-01 PROCEDURE — 85651 RBC SED RATE NONAUTOMATED: CPT

## 2022-01-01 PROCEDURE — 85025 COMPLETE CBC W/AUTO DIFF WBC: CPT

## 2022-01-01 PROCEDURE — 72131 CT LUMBAR SPINE W/O DYE: CPT

## 2022-01-01 PROCEDURE — 25010000002 HEPARIN (PORCINE) PER 1000 UNITS: Performed by: INTERNAL MEDICINE

## 2022-01-01 PROCEDURE — 83605 ASSAY OF LACTIC ACID: CPT | Performed by: FAMILY MEDICINE

## 2022-01-01 PROCEDURE — 97530 THERAPEUTIC ACTIVITIES: CPT

## 2022-01-01 PROCEDURE — 87040 BLOOD CULTURE FOR BACTERIA: CPT | Performed by: FAMILY MEDICINE

## 2022-01-01 PROCEDURE — 93005 ELECTROCARDIOGRAM TRACING: CPT | Performed by: FAMILY MEDICINE

## 2022-01-01 PROCEDURE — 72158 MRI LUMBAR SPINE W/O & W/DYE: CPT

## 2022-01-01 PROCEDURE — 81001 URINALYSIS AUTO W/SCOPE: CPT | Performed by: FAMILY MEDICINE

## 2022-01-01 PROCEDURE — 93306 TTE W/DOPPLER COMPLETE: CPT

## 2022-01-01 PROCEDURE — 25010000002 IOPAMIDOL 61 % SOLUTION: Performed by: HOSPITALIST

## 2022-01-01 PROCEDURE — 63710000001 PREDNISONE PER 5 MG: Performed by: FAMILY MEDICINE

## 2022-01-01 PROCEDURE — 93010 ELECTROCARDIOGRAM REPORT: CPT | Performed by: INTERNAL MEDICINE

## 2022-01-01 PROCEDURE — 93971 EXTREMITY STUDY: CPT

## 2022-01-01 PROCEDURE — 36415 COLL VENOUS BLD VENIPUNCTURE: CPT | Performed by: NURSE PRACTITIONER

## 2022-01-01 PROCEDURE — 87186 SC STD MICRODIL/AGAR DIL: CPT | Performed by: FAMILY MEDICINE

## 2022-01-01 PROCEDURE — 25010000002 CEFAZOLIN PER 500 MG

## 2022-01-01 PROCEDURE — A9579 GAD-BASE MR CONTRAST NOS,1ML: HCPCS | Performed by: HOSPITALIST

## 2022-01-01 PROCEDURE — 87186 SC STD MICRODIL/AGAR DIL: CPT | Performed by: INTERNAL MEDICINE

## 2022-01-01 PROCEDURE — 25010000002 MORPHINE PER 10 MG: Performed by: FAMILY MEDICINE

## 2022-01-01 PROCEDURE — 97162 PT EVAL MOD COMPLEX 30 MIN: CPT

## 2022-01-01 PROCEDURE — 83735 ASSAY OF MAGNESIUM: CPT | Performed by: FAMILY MEDICINE

## 2022-01-01 PROCEDURE — 80053 COMPREHEN METABOLIC PANEL: CPT | Performed by: FAMILY MEDICINE

## 2022-01-01 PROCEDURE — 36415 COLL VENOUS BLD VENIPUNCTURE: CPT

## 2022-01-01 PROCEDURE — 83880 ASSAY OF NATRIURETIC PEPTIDE: CPT | Performed by: FAMILY MEDICINE

## 2022-01-01 PROCEDURE — 85025 COMPLETE CBC W/AUTO DIFF WBC: CPT | Performed by: FAMILY MEDICINE

## 2022-01-01 PROCEDURE — 85025 COMPLETE CBC W/AUTO DIFF WBC: CPT | Performed by: INTERNAL MEDICINE

## 2022-01-01 PROCEDURE — 87150 DNA/RNA AMPLIFIED PROBE: CPT | Performed by: FAMILY MEDICINE

## 2022-01-01 PROCEDURE — 93306 TTE W/DOPPLER COMPLETE: CPT | Performed by: INTERNAL MEDICINE

## 2022-01-01 PROCEDURE — 25010000002 GADOTERIDOL PER 1 ML: Performed by: HOSPITALIST

## 2022-01-01 PROCEDURE — 97166 OT EVAL MOD COMPLEX 45 MIN: CPT

## 2022-01-01 PROCEDURE — 87040 BLOOD CULTURE FOR BACTERIA: CPT | Performed by: INTERNAL MEDICINE

## 2022-01-01 PROCEDURE — 25010000002 CALCIUM GLUCONATE PER 10 ML

## 2022-01-01 PROCEDURE — 80048 BASIC METABOLIC PNL TOTAL CA: CPT | Performed by: INTERNAL MEDICINE

## 2022-01-01 PROCEDURE — 99284 EMERGENCY DEPT VISIT MOD MDM: CPT

## 2022-01-01 PROCEDURE — 36591 DRAW BLOOD OFF VENOUS DEVICE: CPT | Performed by: NURSE PRACTITIONER

## 2022-01-01 PROCEDURE — 87147 CULTURE TYPE IMMUNOLOGIC: CPT | Performed by: FAMILY MEDICINE

## 2022-01-01 PROCEDURE — 71045 X-RAY EXAM CHEST 1 VIEW: CPT

## 2022-01-01 PROCEDURE — 0 DIATRIZOATE MEGLUMINE & SODIUM PER 1 ML: Performed by: HOSPITALIST

## 2022-01-01 PROCEDURE — 25010000002 ONDANSETRON PER 1 MG: Performed by: FAMILY MEDICINE

## 2022-01-01 PROCEDURE — 25010000002 FUROSEMIDE PER 20 MG: Performed by: INTERNAL MEDICINE

## 2022-01-01 PROCEDURE — 84484 ASSAY OF TROPONIN QUANT: CPT | Performed by: FAMILY MEDICINE

## 2022-01-01 PROCEDURE — 70551 MRI BRAIN STEM W/O DYE: CPT

## 2022-01-01 PROCEDURE — 83735 ASSAY OF MAGNESIUM: CPT | Performed by: INTERNAL MEDICINE

## 2022-01-01 PROCEDURE — 93880 EXTRACRANIAL BILAT STUDY: CPT

## 2022-01-01 PROCEDURE — 97535 SELF CARE MNGMENT TRAINING: CPT

## 2022-01-01 PROCEDURE — 97110 THERAPEUTIC EXERCISES: CPT

## 2022-01-01 PROCEDURE — 74177 CT ABD & PELVIS W/CONTRAST: CPT

## 2022-01-01 PROCEDURE — 87636 SARSCOV2 & INF A&B AMP PRB: CPT | Performed by: FAMILY MEDICINE

## 2022-01-01 RX ORDER — TRAMADOL HYDROCHLORIDE 50 MG/1
25 TABLET ORAL EVERY 6 HOURS PRN
Status: DISCONTINUED | OUTPATIENT
Start: 2022-01-01 | End: 2022-01-01 | Stop reason: HOSPADM

## 2022-01-01 RX ORDER — AMOXICILLIN 250 MG
2 CAPSULE ORAL 2 TIMES DAILY
Status: DISCONTINUED | OUTPATIENT
Start: 2022-01-01 | End: 2022-01-01 | Stop reason: HOSPADM

## 2022-01-01 RX ORDER — HYDROXYCHLOROQUINE SULFATE 200 MG/1
400 TABLET, FILM COATED ORAL
Status: DISCONTINUED | OUTPATIENT
Start: 2022-01-01 | End: 2022-01-01 | Stop reason: HOSPADM

## 2022-01-01 RX ORDER — SODIUM CHLORIDE 0.9 % (FLUSH) 0.9 %
10 SYRINGE (ML) INJECTION WEEKLY
Status: CANCELLED
Start: 2022-01-01

## 2022-01-01 RX ORDER — IPRATROPIUM BROMIDE AND ALBUTEROL SULFATE 2.5; .5 MG/3ML; MG/3ML
3 SOLUTION RESPIRATORY (INHALATION) EVERY 4 HOURS PRN
Qty: 1 ML | Refills: 0 | Status: SHIPPED | OUTPATIENT
Start: 2022-01-01

## 2022-01-01 RX ORDER — ONDANSETRON 2 MG/ML
4 INJECTION INTRAMUSCULAR; INTRAVENOUS ONCE
Status: COMPLETED | OUTPATIENT
Start: 2022-01-01 | End: 2022-01-01

## 2022-01-01 RX ORDER — ALUMINA, MAGNESIA, AND SIMETHICONE 2400; 2400; 240 MG/30ML; MG/30ML; MG/30ML
15 SUSPENSION ORAL EVERY 6 HOURS PRN
Status: DISCONTINUED | OUTPATIENT
Start: 2022-01-01 | End: 2022-01-01 | Stop reason: HOSPADM

## 2022-01-01 RX ORDER — TAMSULOSIN HYDROCHLORIDE 0.4 MG/1
0.4 CAPSULE ORAL DAILY
Status: DISCONTINUED | OUTPATIENT
Start: 2022-01-01 | End: 2022-01-01 | Stop reason: HOSPADM

## 2022-01-01 RX ORDER — SODIUM CHLORIDE, SODIUM LACTATE, POTASSIUM CHLORIDE, CALCIUM CHLORIDE 600; 310; 30; 20 MG/100ML; MG/100ML; MG/100ML; MG/100ML
150 INJECTION, SOLUTION INTRAVENOUS CONTINUOUS
Status: DISCONTINUED | OUTPATIENT
Start: 2022-01-01 | End: 2022-01-01

## 2022-01-01 RX ORDER — SODIUM CHLORIDE 0.9 % (FLUSH) 0.9 %
10 SYRINGE (ML) INJECTION AS NEEDED
Status: DISCONTINUED | OUTPATIENT
Start: 2022-01-01 | End: 2022-01-01 | Stop reason: HOSPADM

## 2022-01-01 RX ORDER — ONDANSETRON 2 MG/ML
4 INJECTION INTRAMUSCULAR; INTRAVENOUS EVERY 6 HOURS PRN
Status: DISCONTINUED | OUTPATIENT
Start: 2022-01-01 | End: 2022-01-01 | Stop reason: HOSPADM

## 2022-01-01 RX ORDER — POLYETHYLENE GLYCOL 3350 17 G/17G
17 POWDER, FOR SOLUTION ORAL DAILY
Qty: 1 PACKET | Refills: 0 | Status: SHIPPED | OUTPATIENT
Start: 2022-01-01

## 2022-01-01 RX ORDER — NICOTINE 21 MG/24HR
1 PATCH, TRANSDERMAL 24 HOURS TRANSDERMAL
Status: DISCONTINUED | OUTPATIENT
Start: 2022-01-01 | End: 2022-01-01 | Stop reason: HOSPADM

## 2022-01-01 RX ORDER — ASPIRIN 325 MG
325 TABLET ORAL DAILY
COMMUNITY

## 2022-01-01 RX ORDER — PREDNISONE 1 MG/1
5 TABLET ORAL
Qty: 1 TABLET | Refills: 0 | Status: SHIPPED | OUTPATIENT
Start: 2022-01-01

## 2022-01-01 RX ORDER — LANOLIN ALCOHOL/MO/W.PET/CERES
6 CREAM (GRAM) TOPICAL NIGHTLY PRN
Qty: 1 TABLET | Refills: 0 | Status: SHIPPED | OUTPATIENT
Start: 2022-01-01

## 2022-01-01 RX ORDER — SODIUM CHLORIDE, SODIUM LACTATE, POTASSIUM CHLORIDE, CALCIUM CHLORIDE 600; 310; 30; 20 MG/100ML; MG/100ML; MG/100ML; MG/100ML
150 INJECTION, SOLUTION INTRAVENOUS CONTINUOUS
Status: DISCONTINUED | OUTPATIENT
Start: 2022-01-01 | End: 2022-01-01 | Stop reason: SDUPTHER

## 2022-01-01 RX ORDER — BUPIVACAINE HCL/0.9 % NACL/PF 0.1 %
2 PLASTIC BAG, INJECTION (ML) EPIDURAL EVERY 8 HOURS
Qty: 3000 ML | Refills: 0
Start: 2022-01-01 | End: 2022-01-01

## 2022-01-01 RX ORDER — ALUMINA, MAGNESIA, AND SIMETHICONE 2400; 2400; 240 MG/30ML; MG/30ML; MG/30ML
15 SUSPENSION ORAL EVERY 6 HOURS PRN
Qty: 1 ML | Refills: 0 | Status: SHIPPED | OUTPATIENT
Start: 2022-01-01

## 2022-01-01 RX ORDER — ONDANSETRON 2 MG/ML
4 INJECTION INTRAMUSCULAR; INTRAVENOUS EVERY 6 HOURS PRN
Qty: 1 ML | Refills: 0 | Status: SHIPPED | OUTPATIENT
Start: 2022-01-01

## 2022-01-01 RX ORDER — POLYETHYLENE GLYCOL 3350 17 G/17G
17 POWDER, FOR SOLUTION ORAL DAILY
Status: DISCONTINUED | OUTPATIENT
Start: 2022-01-01 | End: 2022-01-01 | Stop reason: HOSPADM

## 2022-01-01 RX ORDER — OXYCODONE HYDROCHLORIDE 5 MG/1
5 TABLET ORAL EVERY 4 HOURS PRN
Status: DISCONTINUED | OUTPATIENT
Start: 2022-01-01 | End: 2022-01-01 | Stop reason: HOSPADM

## 2022-01-01 RX ORDER — HYDROXYCHLOROQUINE SULFATE 200 MG/1
400 TABLET, FILM COATED ORAL
Qty: 2 TABLET | Refills: 0 | Status: SHIPPED | OUTPATIENT
Start: 2022-01-01 | End: 2022-01-01

## 2022-01-01 RX ORDER — LEFLUNOMIDE 10 MG/1
20 TABLET ORAL DAILY
COMMUNITY

## 2022-01-01 RX ORDER — SODIUM CHLORIDE 0.9 % (FLUSH) 0.9 %
10 SYRINGE (ML) INJECTION EVERY 12 HOURS SCHEDULED
Status: DISCONTINUED | OUTPATIENT
Start: 2022-01-01 | End: 2022-01-01 | Stop reason: HOSPADM

## 2022-01-01 RX ORDER — LANOLIN ALCOHOL/MO/W.PET/CERES
6 CREAM (GRAM) TOPICAL NIGHTLY PRN
Status: DISCONTINUED | OUTPATIENT
Start: 2022-01-01 | End: 2022-01-01 | Stop reason: HOSPADM

## 2022-01-01 RX ORDER — TAMSULOSIN HYDROCHLORIDE 0.4 MG/1
0.4 CAPSULE ORAL DAILY
Qty: 1 CAPSULE | Refills: 0 | Status: SHIPPED | OUTPATIENT
Start: 2022-01-01

## 2022-01-01 RX ORDER — HEPARIN SODIUM 5000 [USP'U]/ML
5000 INJECTION, SOLUTION INTRAVENOUS; SUBCUTANEOUS EVERY 8 HOURS SCHEDULED
Status: DISCONTINUED | OUTPATIENT
Start: 2022-01-01 | End: 2022-01-01 | Stop reason: HOSPADM

## 2022-01-01 RX ORDER — HEPARIN SODIUM 5000 [USP'U]/ML
5000 INJECTION, SOLUTION INTRAVENOUS; SUBCUTANEOUS EVERY 8 HOURS SCHEDULED
Qty: 1 ML | Refills: 0 | Status: SHIPPED | OUTPATIENT
Start: 2022-01-01

## 2022-01-01 RX ORDER — OXYCODONE HYDROCHLORIDE 5 MG/1
5 TABLET ORAL EVERY 4 HOURS PRN
Qty: 1 TABLET | Refills: 0 | Status: CANCELLED | OUTPATIENT
Start: 2022-01-01 | End: 2022-01-01

## 2022-01-01 RX ORDER — HYDROXYCHLOROQUINE SULFATE 200 MG/1
TABLET, FILM COATED ORAL 2 TIMES DAILY
COMMUNITY
End: 2022-01-01 | Stop reason: HOSPADM

## 2022-01-01 RX ORDER — ACETAMINOPHEN 500 MG
1000 TABLET ORAL 2 TIMES DAILY
Status: COMPLETED | OUTPATIENT
Start: 2022-01-01 | End: 2022-01-01

## 2022-01-01 RX ORDER — HYDROCODONE BITARTRATE AND ACETAMINOPHEN 7.5; 325 MG/1; MG/1
1 TABLET ORAL EVERY 4 HOURS PRN
COMMUNITY
Start: 2022-01-01

## 2022-01-01 RX ORDER — FUROSEMIDE 10 MG/ML
20 INJECTION INTRAMUSCULAR; INTRAVENOUS DAILY
Qty: 2 ML | Refills: 0 | Status: SHIPPED | OUTPATIENT
Start: 2022-01-01

## 2022-01-01 RX ORDER — BUPIVACAINE HCL/0.9 % NACL/PF 0.1 %
2 PLASTIC BAG, INJECTION (ML) EPIDURAL EVERY 8 HOURS
Status: DISCONTINUED | OUTPATIENT
Start: 2022-01-01 | End: 2022-01-01 | Stop reason: HOSPADM

## 2022-01-01 RX ORDER — SODIUM CHLORIDE 0.9 % (FLUSH) 0.9 %
10 SYRINGE (ML) INJECTION WEEKLY
Status: DISCONTINUED | OUTPATIENT
Start: 2022-01-01 | End: 2022-01-01 | Stop reason: HOSPADM

## 2022-01-01 RX ORDER — PREDNISONE 1 MG/1
5 TABLET ORAL
Status: DISCONTINUED | OUTPATIENT
Start: 2022-01-01 | End: 2022-01-01 | Stop reason: HOSPADM

## 2022-01-01 RX ORDER — TRAMADOL HYDROCHLORIDE 50 MG/1
25 TABLET ORAL EVERY 6 HOURS PRN
Qty: 1 TABLET | Refills: 0 | Status: CANCELLED | OUTPATIENT
Start: 2022-01-01 | End: 2022-01-01

## 2022-01-01 RX ORDER — FUROSEMIDE 10 MG/ML
20 INJECTION INTRAMUSCULAR; INTRAVENOUS DAILY
Status: DISCONTINUED | OUTPATIENT
Start: 2022-01-01 | End: 2022-01-01 | Stop reason: HOSPADM

## 2022-01-01 RX ORDER — NAPROXEN SODIUM 220 MG
220 TABLET ORAL
COMMUNITY

## 2022-01-01 RX ORDER — SODIUM CHLORIDE 9 MG/ML
125 INJECTION, SOLUTION INTRAVENOUS CONTINUOUS
Status: DISCONTINUED | OUTPATIENT
Start: 2022-01-01 | End: 2022-01-01

## 2022-01-01 RX ORDER — NICOTINE 21 MG/24HR
1 PATCH, TRANSDERMAL 24 HOURS TRANSDERMAL
Qty: 1 PATCH | Refills: 0 | Status: SHIPPED | OUTPATIENT
Start: 2022-01-01

## 2022-01-01 RX ORDER — IPRATROPIUM BROMIDE AND ALBUTEROL SULFATE 2.5; .5 MG/3ML; MG/3ML
3 SOLUTION RESPIRATORY (INHALATION) EVERY 4 HOURS PRN
Status: DISCONTINUED | OUTPATIENT
Start: 2022-01-01 | End: 2022-01-01 | Stop reason: HOSPADM

## 2022-01-01 RX ORDER — PREDNISONE 1 MG/1
5 TABLET ORAL DAILY
COMMUNITY
End: 2022-01-01 | Stop reason: HOSPADM

## 2022-01-01 RX ORDER — AMOXICILLIN 250 MG
2 CAPSULE ORAL 2 TIMES DAILY
Qty: 1 TABLET | Refills: 0 | Status: SHIPPED | OUTPATIENT
Start: 2022-01-01

## 2022-01-01 RX ADMIN — SODIUM CHLORIDE 125 ML/HR: 9 INJECTION, SOLUTION INTRAVENOUS at 12:16

## 2022-01-01 RX ADMIN — ACETAMINOPHEN 1000 MG: 500 TABLET, FILM COATED ORAL at 08:06

## 2022-01-01 RX ADMIN — NICOTINE 1 PATCH: 14 PATCH, EXTENDED RELEASE TRANSDERMAL at 09:10

## 2022-01-01 RX ADMIN — OXYCODONE 5 MG: 5 TABLET ORAL at 15:58

## 2022-01-01 RX ADMIN — NICOTINE 1 PATCH: 14 PATCH, EXTENDED RELEASE TRANSDERMAL at 08:23

## 2022-01-01 RX ADMIN — SODIUM CHLORIDE, POTASSIUM CHLORIDE, SODIUM LACTATE AND CALCIUM CHLORIDE 150 ML/HR: 600; 310; 30; 20 INJECTION, SOLUTION INTRAVENOUS at 15:33

## 2022-01-01 RX ADMIN — TAMSULOSIN HYDROCHLORIDE 0.4 MG: 0.4 CAPSULE ORAL at 12:46

## 2022-01-01 RX ADMIN — TRAMADOL HYDROCHLORIDE 25 MG: 50 TABLET, COATED ORAL at 13:50

## 2022-01-01 RX ADMIN — CEFAZOLIN 2 G: 10 INJECTION, POWDER, FOR SOLUTION INTRAVENOUS at 05:40

## 2022-01-01 RX ADMIN — POLYETHYLENE GLYCOL 3350 17 G: 17 POWDER, FOR SOLUTION ORAL at 16:13

## 2022-01-01 RX ADMIN — Medication 10 ML: at 09:11

## 2022-01-01 RX ADMIN — Medication 10 ML: at 22:12

## 2022-01-01 RX ADMIN — SODIUM CHLORIDE, POTASSIUM CHLORIDE, SODIUM LACTATE AND CALCIUM CHLORIDE 150 ML/HR: 600; 310; 30; 20 INJECTION, SOLUTION INTRAVENOUS at 20:11

## 2022-01-01 RX ADMIN — HEPARIN SODIUM 5000 UNITS: 5000 INJECTION, SOLUTION INTRAVENOUS; SUBCUTANEOUS at 05:08

## 2022-01-01 RX ADMIN — HYDROXYCHLOROQUINE SULFATE 400 MG: 200 TABLET, FILM COATED ORAL at 08:42

## 2022-01-01 RX ADMIN — Medication 10 ML: at 04:16

## 2022-01-01 RX ADMIN — Medication 10 ML: at 15:33

## 2022-01-01 RX ADMIN — OXYCODONE 5 MG: 5 TABLET ORAL at 22:02

## 2022-01-01 RX ADMIN — DOCUSATE SODIUM 50 MG AND SENNOSIDES 8.6 MG 2 TABLET: 8.6; 5 TABLET, FILM COATED ORAL at 21:08

## 2022-01-01 RX ADMIN — HEPARIN SODIUM 5000 UNITS: 5000 INJECTION, SOLUTION INTRAVENOUS; SUBCUTANEOUS at 05:06

## 2022-01-01 RX ADMIN — HEPARIN SODIUM 5000 UNITS: 5000 INJECTION, SOLUTION INTRAVENOUS; SUBCUTANEOUS at 13:48

## 2022-01-01 RX ADMIN — MORPHINE SULFATE 4 MG: 4 INJECTION, SOLUTION INTRAMUSCULAR; INTRAVENOUS at 10:54

## 2022-01-01 RX ADMIN — OXYCODONE 5 MG: 5 TABLET ORAL at 02:40

## 2022-01-01 RX ADMIN — OXYCODONE 5 MG: 5 TABLET ORAL at 18:02

## 2022-01-01 RX ADMIN — Medication 10 ML: at 15:35

## 2022-01-01 RX ADMIN — HEPARIN SODIUM 5000 UNITS: 5000 INJECTION, SOLUTION INTRAVENOUS; SUBCUTANEOUS at 15:33

## 2022-01-01 RX ADMIN — OXYCODONE 5 MG: 5 TABLET ORAL at 02:55

## 2022-01-01 RX ADMIN — ACETAMINOPHEN 1000 MG: 500 TABLET, FILM COATED ORAL at 15:33

## 2022-01-01 RX ADMIN — NICOTINE 1 PATCH: 14 PATCH, EXTENDED RELEASE TRANSDERMAL at 13:48

## 2022-01-01 RX ADMIN — TRAMADOL HYDROCHLORIDE 25 MG: 50 TABLET, COATED ORAL at 21:21

## 2022-01-01 RX ADMIN — CEFAZOLIN 2 G: 10 INJECTION, POWDER, FOR SOLUTION INTRAVENOUS at 04:54

## 2022-01-01 RX ADMIN — CEFAZOLIN 2 G: 10 INJECTION, POWDER, FOR SOLUTION INTRAVENOUS at 12:15

## 2022-01-01 RX ADMIN — DOCUSATE SODIUM 50 MG AND SENNOSIDES 8.6 MG 2 TABLET: 8.6; 5 TABLET, FILM COATED ORAL at 14:09

## 2022-01-01 RX ADMIN — ACETAMINOPHEN 1000 MG: 500 TABLET, FILM COATED ORAL at 21:04

## 2022-01-01 RX ADMIN — HYDROXYCHLOROQUINE SULFATE 400 MG: 200 TABLET, FILM COATED ORAL at 08:25

## 2022-01-01 RX ADMIN — GADOTERIDOL 17 ML: 279.3 INJECTION, SOLUTION INTRAVENOUS at 18:00

## 2022-01-01 RX ADMIN — HEPARIN SODIUM 5000 UNITS: 5000 INJECTION, SOLUTION INTRAVENOUS; SUBCUTANEOUS at 05:40

## 2022-01-01 RX ADMIN — CEFAZOLIN 2 G: 10 INJECTION, POWDER, FOR SOLUTION INTRAVENOUS at 05:13

## 2022-01-01 RX ADMIN — OXYCODONE 5 MG: 5 TABLET ORAL at 16:13

## 2022-01-01 RX ADMIN — SODIUM CHLORIDE, POTASSIUM CHLORIDE, SODIUM LACTATE AND CALCIUM CHLORIDE 150 ML/HR: 600; 310; 30; 20 INJECTION, SOLUTION INTRAVENOUS at 02:55

## 2022-01-01 RX ADMIN — Medication 10 ML: at 20:12

## 2022-01-01 RX ADMIN — HEPARIN SODIUM 5000 UNITS: 5000 INJECTION, SOLUTION INTRAVENOUS; SUBCUTANEOUS at 21:03

## 2022-01-01 RX ADMIN — SODIUM CHLORIDE 1000 ML: 9 INJECTION, SOLUTION INTRAVENOUS at 10:52

## 2022-01-01 RX ADMIN — CEFAZOLIN 2 G: 10 INJECTION, POWDER, FOR SOLUTION INTRAVENOUS at 21:21

## 2022-01-01 RX ADMIN — PREDNISONE 5 MG: 5 TABLET ORAL at 08:42

## 2022-01-01 RX ADMIN — HEPARIN SODIUM 5000 UNITS: 5000 INJECTION, SOLUTION INTRAVENOUS; SUBCUTANEOUS at 05:00

## 2022-01-01 RX ADMIN — ACETAMINOPHEN 1000 MG: 500 TABLET, FILM COATED ORAL at 22:50

## 2022-01-01 RX ADMIN — OXYCODONE 5 MG: 5 TABLET ORAL at 05:08

## 2022-01-01 RX ADMIN — OXYCODONE 5 MG: 5 TABLET ORAL at 05:06

## 2022-01-01 RX ADMIN — CEFAZOLIN 2 G: 10 INJECTION, POWDER, FOR SOLUTION INTRAVENOUS at 04:16

## 2022-01-01 RX ADMIN — HEPARIN SODIUM 5000 UNITS: 5000 INJECTION, SOLUTION INTRAVENOUS; SUBCUTANEOUS at 21:04

## 2022-01-01 RX ADMIN — Medication 10 ML: at 21:25

## 2022-01-01 RX ADMIN — OXYCODONE 5 MG: 5 TABLET ORAL at 17:55

## 2022-01-01 RX ADMIN — SODIUM BICARBONATE 75 MEQ: 84 INJECTION, SOLUTION INTRAVENOUS at 23:24

## 2022-01-01 RX ADMIN — Medication 10 ML: at 22:11

## 2022-01-01 RX ADMIN — HEPARIN SODIUM 5000 UNITS: 5000 INJECTION, SOLUTION INTRAVENOUS; SUBCUTANEOUS at 13:50

## 2022-01-01 RX ADMIN — HEPARIN SODIUM 5000 UNITS: 5000 INJECTION, SOLUTION INTRAVENOUS; SUBCUTANEOUS at 15:58

## 2022-01-01 RX ADMIN — Medication 10 ML: at 08:43

## 2022-01-01 RX ADMIN — HYDROXYCHLOROQUINE SULFATE 400 MG: 200 TABLET, FILM COATED ORAL at 16:24

## 2022-01-01 RX ADMIN — TRAMADOL HYDROCHLORIDE 25 MG: 50 TABLET, COATED ORAL at 21:02

## 2022-01-01 RX ADMIN — DIATRIZOATE MEGLUMINE AND DIATRIZOATE SODIUM 30 ML: 660; 100 LIQUID ORAL; RECTAL at 13:13

## 2022-01-01 RX ADMIN — OXYCODONE 5 MG: 5 TABLET ORAL at 20:11

## 2022-01-01 RX ADMIN — PREDNISONE 5 MG: 5 TABLET ORAL at 09:10

## 2022-01-01 RX ADMIN — TRAMADOL HYDROCHLORIDE 25 MG: 50 TABLET, COATED ORAL at 07:37

## 2022-01-01 RX ADMIN — Medication 10 ML: at 21:04

## 2022-01-01 RX ADMIN — AZITHROMYCIN MONOHYDRATE 500 MG: 500 INJECTION, POWDER, LYOPHILIZED, FOR SOLUTION INTRAVENOUS at 13:24

## 2022-01-01 RX ADMIN — Medication 6 MG: at 21:02

## 2022-01-01 RX ADMIN — ONDANSETRON 4 MG: 2 INJECTION INTRAMUSCULAR; INTRAVENOUS at 10:53

## 2022-01-01 RX ADMIN — CEFAZOLIN 2 G: 10 INJECTION, POWDER, FOR SOLUTION INTRAVENOUS at 05:23

## 2022-01-01 RX ADMIN — CEFAZOLIN 2 G: 10 INJECTION, POWDER, FOR SOLUTION INTRAVENOUS at 21:02

## 2022-01-01 RX ADMIN — OXYCODONE 5 MG: 5 TABLET ORAL at 10:14

## 2022-01-01 RX ADMIN — MORPHINE SULFATE 4 MG: 4 INJECTION, SOLUTION INTRAMUSCULAR; INTRAVENOUS at 13:24

## 2022-01-01 RX ADMIN — FUROSEMIDE 20 MG: 10 INJECTION, SOLUTION INTRAMUSCULAR; INTRAVENOUS at 16:09

## 2022-01-01 RX ADMIN — HEPARIN SODIUM 5000 UNITS: 5000 INJECTION, SOLUTION INTRAVENOUS; SUBCUTANEOUS at 14:09

## 2022-01-01 RX ADMIN — SODIUM BICARBONATE 75 MEQ: 84 INJECTION, SOLUTION INTRAVENOUS at 04:54

## 2022-01-01 RX ADMIN — CEFAZOLIN 2 G: 10 INJECTION, POWDER, FOR SOLUTION INTRAVENOUS at 12:08

## 2022-01-01 RX ADMIN — SODIUM BICARBONATE 75 MEQ: 84 INJECTION, SOLUTION INTRAVENOUS at 10:08

## 2022-01-01 RX ADMIN — OXYCODONE 5 MG: 5 TABLET ORAL at 22:15

## 2022-01-01 RX ADMIN — CEFAZOLIN 2 G: 10 INJECTION, POWDER, FOR SOLUTION INTRAVENOUS at 22:08

## 2022-01-01 RX ADMIN — CEFAZOLIN 2 G: 10 INJECTION, POWDER, FOR SOLUTION INTRAVENOUS at 12:34

## 2022-01-01 RX ADMIN — IOPAMIDOL 90 ML: 612 INJECTION, SOLUTION INTRAVENOUS at 15:13

## 2022-01-01 RX ADMIN — CEFTRIAXONE SODIUM 2 G: 2 INJECTION, POWDER, FOR SOLUTION INTRAMUSCULAR; INTRAVENOUS at 13:24

## 2022-01-01 RX ADMIN — SODIUM BICARBONATE 75 MEQ: 84 INJECTION, SOLUTION INTRAVENOUS at 01:27

## 2022-01-01 RX ADMIN — CALCIUM GLUCONATE 1 G: 98 INJECTION, SOLUTION INTRAVENOUS at 02:30

## 2022-01-01 RX ADMIN — NICOTINE 1 PATCH: 14 PATCH, EXTENDED RELEASE TRANSDERMAL at 08:43

## 2022-01-01 RX ADMIN — CEFAZOLIN 2 G: 10 INJECTION, POWDER, FOR SOLUTION INTRAVENOUS at 13:46

## 2022-01-01 RX ADMIN — HEPARIN SODIUM 5000 UNITS: 5000 INJECTION, SOLUTION INTRAVENOUS; SUBCUTANEOUS at 21:25

## 2022-01-01 RX ADMIN — CEFAZOLIN 2 G: 10 INJECTION, POWDER, FOR SOLUTION INTRAVENOUS at 22:11

## 2022-01-01 RX ADMIN — HYDROXYCHLOROQUINE SULFATE 400 MG: 200 TABLET, FILM COATED ORAL at 09:10

## 2022-01-01 RX ADMIN — OXYCODONE 5 MG: 5 TABLET ORAL at 12:07

## 2022-01-01 RX ADMIN — HEPARIN SODIUM 5000 UNITS: 5000 INJECTION, SOLUTION INTRAVENOUS; SUBCUTANEOUS at 22:02

## 2022-01-01 RX ADMIN — Medication 10 ML: at 02:30

## 2022-01-01 RX ADMIN — OXYCODONE 5 MG: 5 TABLET ORAL at 08:07

## 2022-01-01 RX ADMIN — OXYCODONE 5 MG: 5 TABLET ORAL at 00:30

## 2022-01-01 RX ADMIN — TRAMADOL HYDROCHLORIDE 25 MG: 50 TABLET, COATED ORAL at 22:50

## 2022-01-01 RX ADMIN — PREDNISONE 5 MG: 5 TABLET ORAL at 08:25

## 2022-01-01 RX ADMIN — HEPARIN SODIUM 5000 UNITS: 5000 INJECTION, SOLUTION INTRAVENOUS; SUBCUTANEOUS at 22:50

## 2022-03-03 PROBLEM — M62.82 NON-TRAUMATIC RHABDOMYOLYSIS: Status: ACTIVE | Noted: 2022-01-01

## 2022-03-03 NOTE — ED NOTES
Patient is at risk for falls. Yellow arm band placed. Yellow non-skid socks applied  Gait belt is readidly accessible. Yellow light above door on. Call light within reach.              Madelyn Brumfield RN  03/03/22 [id number]

## 2022-03-03 NOTE — ED NOTES
Per EMS they are en route with patient for back pain. Pt fell this am around 0400 and has been in floor since. Has had 3 falls in the past few days. ETA 3-5 mins.     Cara Arellano, RN  03/03/22 [id number]

## 2022-03-03 NOTE — ED NOTES
Patient on back board, pt states he has had reoccurring falls for the past few days. Pt reports that he fell at approximately 3 am today and is complaining of 10/10 back pain. Pt states he has been getting increasingly weak the past few days.      Fidelina Paige, RN  03/03/22 5040

## 2022-03-03 NOTE — ED NOTES
This tech attempted twice to collect pt's blood cultures. Rn notified     Yumiko Aguilar  03/03/22 [id number]

## 2022-03-03 NOTE — ED PROVIDER NOTES
Subjective   Patient had several falls last night while walking with his walker.  His son is been to his house several times to help him up.  His last fall was around 4:00 this morning, and patient state he laid on the ground until 8 AM when his son came to his house.  He has a history of chronic back pain with multiple surgeries.        Fall  Mechanism of injury: fall    Incident location:  Home  Time since incident:  6 hours  Fall:     Fall occurred:  Walking    Height of fall:  GLF    Impact surface:  Hard floor    Point of impact:  Unable to specify  Protective equipment: none    Suspicion of alcohol use: no    Suspicion of drug use: no    Prior to arrival data:     Bystander interventions:  None    Blood loss:  None    Responsiveness at scene:  Alert    Orientation at scene:  Person, place, situation and time    Loss of consciousness: no      Amnesic to event: no      Airway interventions:  None  Associated symptoms: back pain    Associated symptoms: no abdominal pain, no chest pain, no headaches, no nausea, no neck pain, no seizures and no vomiting    Back Pain  Associated symptoms: no abdominal pain, no chest pain, no dysuria, no fever, no headaches and no weakness    Weakness - Generalized  Associated symptoms: myalgias    Associated symptoms: no abdominal pain, no chest pain, no cough, no diarrhea, no dizziness, no dysuria, no fever, no frequency, no headaches, no nausea, no seizures, no shortness of breath, no urgency and no vomiting        Review of Systems   Constitutional: Positive for activity change. Negative for appetite change, chills, diaphoresis, fatigue and fever.   HENT: Negative for congestion, ear discharge, ear pain, nosebleeds, rhinorrhea, sinus pressure, sore throat and trouble swallowing.    Eyes: Negative for discharge and redness.   Respiratory: Negative for apnea, cough, chest tightness, shortness of breath and wheezing.    Cardiovascular: Negative for chest pain.   Gastrointestinal:  Negative for abdominal pain, diarrhea, nausea and vomiting.   Endocrine: Negative for polyuria.   Genitourinary: Negative for dysuria, frequency and urgency.   Musculoskeletal: Positive for back pain and myalgias. Negative for neck pain.   Skin: Negative for color change and rash.   Allergic/Immunologic: Negative for immunocompromised state.   Neurological: Negative for dizziness, seizures, syncope, weakness, light-headedness and headaches.   Hematological: Negative for adenopathy. Does not bruise/bleed easily.   Psychiatric/Behavioral: Negative for behavioral problems and confusion.   All other systems reviewed and are negative.      Past Medical History:   Diagnosis Date   • Cancer (HCC)     skin   • Carpal tunnel syndrome    • GERD (gastroesophageal reflux disease)    • History of blood transfusion    • Rheumatoid arthritis (HCC)        Allergies   Allergen Reactions   • Abatacept Hives   • Adhesive Tape Other (See Comments)     Blisters skin  ,  Blood poisoning   • Wound Dressing Adhesive Other (See Comments)     Other reaction(s): Other (See Comments)  Blisters skin  ,  Blood poisoning       Past Surgical History:   Procedure Laterality Date   • BACK SURGERY     • CARDIAC CATHETERIZATION     • CARPAL TUNNEL RELEASE     • EYE SURGERY     • OTHER SURGICAL HISTORY      lumbar disc fusion   • TRIGGER FINGER RELEASE         History reviewed. No pertinent family history.    Social History     Socioeconomic History   • Marital status:    Tobacco Use   • Smoking status: Current Every Day Smoker     Types: Cigarettes           Objective   Physical Exam  Vitals and nursing note reviewed.   Constitutional:       Appearance: He is well-developed.   HENT:      Head: Normocephalic and atraumatic.      Nose: Nose normal.   Eyes:      General: No scleral icterus.        Right eye: No discharge.         Left eye: No discharge.      Conjunctiva/sclera: Conjunctivae normal.      Pupils: Pupils are equal, round, and  reactive to light.   Neck:      Trachea: No tracheal deviation.   Cardiovascular:      Rate and Rhythm: Normal rate and regular rhythm.      Heart sounds: Normal heart sounds. No murmur heard.      Pulmonary:      Effort: Pulmonary effort is normal. No respiratory distress.      Breath sounds: Normal breath sounds. No stridor. No wheezing or rales.   Abdominal:      General: Bowel sounds are normal. There is no distension.      Palpations: Abdomen is soft. There is no mass.      Tenderness: There is no abdominal tenderness. There is no guarding or rebound.   Musculoskeletal:      Cervical back: Normal range of motion and neck supple.      Lumbar back: Tenderness and bony tenderness present. No swelling, edema, deformity, signs of trauma or lacerations.        Back:    Skin:     General: Skin is warm and dry.      Findings: No erythema or rash.   Neurological:      Mental Status: He is alert and oriented to person, place, and time.      Coordination: Coordination normal.   Psychiatric:         Behavior: Behavior normal.         Thought Content: Thought content normal.         ECG 12 Lead      Date/Time: 3/3/2022 1:06 PM  Performed by: Branedn Lanza MD  Authorized by: Branden Lanza MD   Interpreted by physician  Rhythm: sinus tachycardia  BPM: 126  ST Segments: ST segments normal                   ED Course                  Labs Reviewed   COMPREHENSIVE METABOLIC PANEL - Abnormal; Notable for the following components:       Result Value    Glucose 120 (*)     BUN 37 (*)     Creatinine 1.45 (*)     Albumin 3.20 (*)     AST (SGOT) 143 (*)     BUN/Creatinine Ratio 25.5 (*)     eGFR 49.6 (*)     All other components within normal limits    Narrative:     GFR Normal >60  Chronic Kidney Disease <60  Kidney Failure <15     CK - Abnormal; Notable for the following components:    Creatine Kinase 7,773 (*)     All other components within normal limits   CBC WITH AUTO DIFFERENTIAL - Abnormal; Notable for the  following components:    WBC 12.82 (*)     Neutrophil % 87.4 (*)     Lymphocyte % 2.2 (*)     Immature Grans % 1.2 (*)     Neutrophils, Absolute 11.19 (*)     Lymphocytes, Absolute 0.28 (*)     Immature Grans, Absolute 0.16 (*)     All other components within normal limits   URINALYSIS W/ CULTURE IF INDICATED - Abnormal; Notable for the following components:    Appearance, UA Turbid (*)     Ketones, UA 40 mg/dL (2+) (*)     Bilirubin, UA Small (1+) (*)     Blood, UA Large (3+) (*)     Protein,  mg/dL (2+) (*)     Leuk Esterase, UA Trace (*)     All other components within normal limits   URINALYSIS, MICROSCOPIC ONLY - Abnormal; Notable for the following components:    RBC, UA 31-50 (*)     All other components within normal limits   COVID-19 AND FLU A/B, NP SWAB IN TRANSPORT MEDIA 8-12 HR TAT - Normal    Narrative:     Fact sheet for providers: https://www.fda.gov/media/144381/download    Fact sheet for patients: https://www.fda.gov/media/737545/download    Test performed by PCR.   MAGNESIUM - Normal   TROPONIN (IN-HOUSE) - Normal    Narrative:     Troponin T Reference Range:  <= 0.03 ng/mL-   Negative for AMI  >0.03 ng/mL-     Abnormal for myocardial necrosis.  Clinicians would have to utilize clinical acumen, EKG, Troponin and serial changes to determine if it is an Acute Myocardial Infarction or myocardial injury due to an underlying chronic condition.       Results may be falsely decreased if patient taking Biotin.     BNP (IN-HOUSE) - Normal    Narrative:     Among patients with dyspnea, NT-proBNP is highly sensitive for the detection of acute congestive heart failure. In addition NT-proBNP of <300 pg/ml effectively rules out acute congestive heart failure with 99% negative predictive value.    Results may be falsely decreased if patient taking Biotin.     BLOOD CULTURE   BLOOD CULTURE   LACTIC ACID, PLASMA   CBC AND DIFFERENTIAL    Narrative:     The following orders were created for panel order CBC &  Differential.  Procedure                               Abnormality         Status                     ---------                               -----------         ------                     CBC Auto Differential[[id number]]        Abnormal            Final result                 Please view results for these tests on the individual orders.   EXTRA TUBES    Narrative:     The following orders were created for panel order Extra Tubes.  Procedure                               Abnormality         Status                     ---------                               -----------         ------                     Lavender Top[[id number]]                                     Final result               Gold Top - SST[[id number]]                                   Final result               Light Blue Top[[id number]]                                   Final result                 Please view results for these tests on the individual orders.   LAVENDER TOP   GOLD TOP - SST   LIGHT BLUE TOP       XR Chest 1 View   Final Result   CONCLUSION:   Chronic obstructive pulmonary disease.   Minimal subsegmental infiltrates right midlung and right lung   base.      [id number]      Electronically signed by:  Rolo Velasco MD  3/3/2022 10:45 AM CST   Workstation: [id number]      CT Lumbar Spine Without Contrast    (Results Pending)                                        MDM    Final diagnoses:   Non-traumatic rhabdomyolysis   Pneumonia of right lung due to infectious organism, unspecified part of lung       ED Disposition  ED Disposition     ED Disposition Condition Comment    Decision to Admit  Level of Care: Med/Surg [1]   Diagnosis: Non-traumatic rhabdomyolysis [[id number]]   Admitting Physician: VANITA OLIVERA [[id number]]   Attending Physician: VANITA OLIVERA [[id number]]   Certification: I Certify That Inpatient Hospital Services Are Medically Necessary For Greater Than 2 Midnights            No follow-up provider specified.       Medication List      No  changes were made to your prescriptions during this visit.          Branden Lanza MD  03/03/22 [id number]

## 2022-03-04 NOTE — SIGNIFICANT NOTE
03/04/22 1439   OTHER   Discipline occupational therapist; physical therapist   Rehab Time/Intention   Session Not Performed other (see comments)   Recommendation   PT - Next Appointment 03/05/22   Recommendation   OT - Next Appointment 03/05/22   OT/PT theresa attempted. RN defer today due to pt being in significant pain and awaiting arthritis medication. Will f/u 3/5/22

## 2022-03-04 NOTE — H&P
"    [facility]  HISTORY AND PHYSICAL      Date of Admission: 3/3/2022  Primary Care Physician: Provider, No Known    Subjective     Chief Complaint: Recurrent falls this morning    Fall    Back Pain    Weakness - Generalized      Nice patient past medical history of rheumatoid arthritis, LBP presents with recurrent falls.  He went brought to hospital by son.  Son reports that patient first fell at around 2 AM this morning.  Son helped patient get off floor, and patient fell again at 4 AM.  Son again helped patient back to bed, and patient reportedly fell again at 8 AM this morning.  During 8am fall episode, patient was found on the floor down by son.  Patient immediately brought to hospital and noted to suffer from elevated heart rate and CPK slightly over 7700.  Denies muscle pain during interview with Dr. Lee.  Admits to generalized muscle weakness for several days.  Family states patient has suffered from decreased p.o. intake for 2 days.  Per patient \"I am too weak to fix myself food.\"  Patient reports no prodromal symptoms prior to fall episodes.  \"I just slip and slide.\"  Denies chest pain, shortness of breath, lightheadedness, vertigo, ataxia, nausea, vomiting, palpitations.  Patient has noted some productive clear sputum over last 3 to 4 days, but denies fever/chills.  Slightly elevated white blood count 12.8 noted at time of admission.        Review of Systems   Musculoskeletal: Positive for back pain.      Otherwise complete ROS reviewed and negative except as mentioned in the HPI.    Past Medical History:   Past Medical History:   Diagnosis Date   • Cancer (HCC)     skin   • Carpal tunnel syndrome    • GERD (gastroesophageal reflux disease)    • History of blood transfusion    • Rheumatoid arthritis (HCC)      Past Surgical History:  Past Surgical History:   Procedure Laterality Date   • BACK SURGERY     • CARDIAC CATHETERIZATION     • CARPAL " "TUNNEL RELEASE     • EYE SURGERY     • OTHER SURGICAL HISTORY      lumbar disc fusion   • TRIGGER FINGER RELEASE       Social History:  reports that he has been smoking cigarettes. He does not have any smokeless tobacco history on file.    Family History: family history is not on file.     Dad with lung cancer  Mother with dementia      Allergies:  Allergies   Allergen Reactions   • Abatacept Hives   • Adhesive Tape Other (See Comments)     Blisters skin  ,  Blood poisoning   • Wound Dressing Adhesive Other (See Comments)     Other reaction(s): Other (See Comments)  Blisters skin  ,  Blood poisoning       Medications:  Prior to Admission medications    Medication Sig Start Date End Date Taking? Authorizing Provider   HYDROcodone-acetaminophen (NORCO) 7.5-325 MG per tablet Take 1 tablet by mouth. 3/1/22  Yes Yadira Bennett MD   aspirin 325 MG tablet Take 325 mg by mouth.    ProviderYadira MD   naproxen sodium (ALEVE) 220 MG tablet Take 220 mg by mouth.    ProviderYadira MD     I have utilized all available immediate resources to obtain, update, and review the patient's current medications.    Objective     Vital Signs: /66 (BP Location: Right arm, Patient Position: Lying)   Pulse 105   Temp 96.4 °F (35.8 °C) (Oral)   Resp 18   Ht 180.3 cm (70.98\")   Wt 75.8 kg (167 lb 1.7 oz)   SpO2 96%   BMI 23.32 kg/m²   Physical Exam  Constitutional:       Appearance: Normal appearance. He is normal weight.   HENT:      Head: Normocephalic and atraumatic.      Nose: Nose normal.      Mouth/Throat:      Mouth: Mucous membranes are dry.   Eyes:      Extraocular Movements: Extraocular movements intact.      Conjunctiva/sclera: Conjunctivae normal.      Pupils: Pupils are equal, round, and reactive to light.   Cardiovascular:      Rate and Rhythm: Tachycardia present.      Pulses: Normal pulses.      Heart sounds: Normal heart sounds.   Pulmonary:      Effort: Pulmonary effort is normal.      Breath " sounds: Normal breath sounds.   Abdominal:      General: Abdomen is flat.      Palpations: Abdomen is soft.   Musculoskeletal:         General: Normal range of motion.      Cervical back: Normal range of motion and neck supple.   Skin:     General: Skin is warm.      Capillary Refill: Capillary refill takes less than 2 seconds.   Neurological:      Mental Status: He is alert.      Comments: Diffuse muscle weakness noted during musculoskeletal exam.  RUE 2/5, RLE 2/5, LLE 3/5, LUE 3/5.  No facial droop, dysarthria, or slurring noted during physical examination   Psychiatric:         Mood and Affect: Mood normal.         Behavior: Behavior normal.         Thought Content: Thought content normal.         Judgment: Judgment normal.              Results Reviewed:  Lab Results (last 24 hours)     Procedure Component Value Units Date/Time    Blood Culture - Blood, Arm, Left [[id number]] Collected: 03/03/22 1419    Specimen: Blood from Arm, Left Updated: 03/03/22 1425    Lactic Acid, Plasma [[id number]]  (Normal) Collected: 03/03/22 1323    Specimen: Blood Updated: 03/03/22 1350     Lactate 1.8 mmol/L     Blood Culture - Blood, Arm, Left [[id number]] Collected: 03/03/22 1323    Specimen: Blood from Arm, Left Updated: 03/03/22 1331    Urinalysis, Microscopic Only - Urine, Clean Catch [[id number]]  (Abnormal) Collected: 03/03/22 1043    Specimen: Urine, Clean Catch Updated: 03/03/22 1155     RBC, UA 31-50 /HPF      WBC, UA None Seen /HPF      Bacteria, UA None Seen /HPF      Squamous Epithelial Cells, UA 0-2 /HPF      Hyaline Casts, UA 0-2 /LPF      Granular Casts, UA 3-6 /LPF      Methodology Automated Microscopy    Extra Tubes [[id number]] Collected: 03/03/22 1031    Specimen: Blood, Venous Line Updated: 03/03/22 1145    Narrative:      The following orders were created for panel order Extra Tubes.  Procedure                               Abnormality         Status                     ---------                                -----------         ------                     Lavender Top[[id number]]                                     Final result               Gold Top - SST[[id number]]                                   Final result               Light Blue Top[[id number]]                                   Final result                 Please view results for these tests on the individual orders.    Lavender Top [[id number]] Collected: 03/03/22 1031    Specimen: Blood Updated: 03/03/22 1145     Extra Tube hold for add-on     Comment: Auto resulted       Gold Top - SST [[id number]] Collected: 03/03/22 1031    Specimen: Blood Updated: 03/03/22 1145     Extra Tube Hold for add-ons.     Comment: Auto resulted.       Light Blue Top [[id number]] Collected: 03/03/22 1031    Specimen: Blood Updated: 03/03/22 1145     Extra Tube hold for add-on     Comment: Auto resulted       CK [[id number]]  (Abnormal) Collected: 03/03/22 1031    Specimen: Blood Updated: 03/03/22 1126     Creatine Kinase 7,773 U/L     Comprehensive Metabolic Panel [[id number]]  (Abnormal) Collected: 03/03/22 1031    Specimen: Blood Updated: 03/03/22 1114     Glucose 120 mg/dL      BUN 37 mg/dL      Creatinine 1.45 mg/dL      Sodium 137 mmol/L      Potassium 4.4 mmol/L      Chloride 101 mmol/L      CO2 22.0 mmol/L      Calcium 9.7 mg/dL      Total Protein 6.8 g/dL      Albumin 3.20 g/dL      ALT (SGPT) 38 U/L      AST (SGOT) 143 U/L      Alkaline Phosphatase 61 U/L      Total Bilirubin 1.2 mg/dL      Globulin 3.6 gm/dL      A/G Ratio 0.9 g/dL      BUN/Creatinine Ratio 25.5     Anion Gap 14.0 mmol/L      eGFR 49.6 mL/min/1.73      Comment: National Kidney Foundation and American Society of Nephrology (ASN) Task Force recommended calculation based on the Chronic Kidney Disease Epidemiology Collaboration (CKD-EPI) equation refit without adjustment for race.       Narrative:      GFR Normal >60  Chronic Kidney Disease <60  Kidney Failure <15      Magnesium [[id number]]  (Normal) Collected:  03/03/22 1031    Specimen: Blood Updated: 03/03/22 1114     Magnesium 2.1 mg/dL     Troponin [[id number]]  (Normal) Collected: 03/03/22 1031    Specimen: Blood Updated: 03/03/22 1114     Troponin T <0.010 ng/mL     Narrative:      Troponin T Reference Range:  <= 0.03 ng/mL-   Negative for AMI  >0.03 ng/mL-     Abnormal for myocardial necrosis.  Clinicians would have to utilize clinical acumen, EKG, Troponin and serial changes to determine if it is an Acute Myocardial Infarction or myocardial injury due to an underlying chronic condition.       Results may be falsely decreased if patient taking Biotin.      BNP [[id number]]  (Normal) Collected: 03/03/22 1031    Specimen: Blood Updated: 03/03/22 1112     proBNP 1,633.0 pg/mL     Narrative:      Among patients with dyspnea, NT-proBNP is highly sensitive for the detection of acute congestive heart failure. In addition NT-proBNP of <300 pg/ml effectively rules out acute congestive heart failure with 99% negative predictive value.    Results may be falsely decreased if patient taking Biotin.      COVID-19 and FLU A/B PCR - Swab, Nasopharynx [[id number]]  (Normal) Collected: 03/03/22 1032    Specimen: Swab from Nasopharynx Updated: 03/03/22 1110     COVID19 Not Detected     Influenza A PCR Not Detected     Influenza B PCR Not Detected    Narrative:      Fact sheet for providers: https://www.fda.gov/media/867301/download    Fact sheet for patients: https://www.fda.gov/media/141438/download    Test performed by PCR.    Urinalysis With Culture If Indicated - Urine, Clean Catch [[id number]]  (Abnormal) Collected: 03/03/22 1043    Specimen: Urine, Clean Catch Updated: 03/03/22 1056     Color, UA Dark Yellow     Appearance, UA Turbid     pH, UA <=5.0     Specific Gravity, UA 1.020     Glucose, UA Negative     Ketones, UA 40 mg/dL (2+)     Bilirubin, UA Small (1+)     Blood, UA Large (3+)     Protein,  mg/dL (2+)     Leuk Esterase, UA Trace     Nitrite, UA Negative      Urobilinogen, UA 1.0 E.U./dL    CBC & Differential [[id number]]  (Abnormal) Collected: 03/03/22 1032    Specimen: Blood Updated: 03/03/22 1051    Narrative:      The following orders were created for panel order CBC & Differential.  Procedure                               Abnormality         Status                     ---------                               -----------         ------                     CBC Auto Differential[[id number]]        Abnormal            Final result                 Please view results for these tests on the individual orders.    CBC Auto Differential [[id number]]  (Abnormal) Collected: 03/03/22 1032    Specimen: Blood Updated: 03/03/22 1051     WBC 12.82 10*3/mm3      RBC 5.18 10*6/mm3      Hemoglobin 16.0 g/dL      Hematocrit 47.1 %      MCV 90.9 fL      MCH 30.9 pg      MCHC 34.0 g/dL      RDW 13.8 %      RDW-SD 46.4 fl      MPV 11.1 fL      Platelets 195 10*3/mm3      Neutrophil % 87.4 %      Lymphocyte % 2.2 %      Monocyte % 5.7 %      Eosinophil % 3.0 %      Basophil % 0.5 %      Immature Grans % 1.2 %      Neutrophils, Absolute 11.19 10*3/mm3      Lymphocytes, Absolute 0.28 10*3/mm3      Monocytes, Absolute 0.73 10*3/mm3      Eosinophils, Absolute 0.39 10*3/mm3      Basophils, Absolute 0.07 10*3/mm3      Immature Grans, Absolute 0.16 10*3/mm3      nRBC 0.0 /100 WBC         Imaging Results (Last 24 Hours)     Procedure Component Value Units Date/Time    CT Lumbar Spine Without Contrast [[id number]] Collected: 03/03/22 1108     Updated: 03/03/22 1358    Narrative:      EXAM: CT LUMBAR SPINE WO CONTRAST    DATE: 3/3/2022 11:08 AM CST    TECHNIQUE: Axial images were obtained at 3 mm intervals through  the lumbar spine. Coronal and sagittal reformatted images were  obtained    This exam was performed according to our departmental  dose-optimization program, which includes automated exposure  control, adjustment of the mA and/or kV according to patient size  and/or use of iterative  reconstruction technique.    CLINICAL INDICATION: lumbar pain, frequent falls    COMPARISON: Report from prior plain films of the lumbar spine  July 2000    FINDINGS: Postoperative changes are noted in the lower lumbar  spine with bilateral pedicle screws at the L3 and L4 level with a  pedicle screw on the left at L5 and a screw traversing the L4-5  facet on the right. There is no radiographic evidence of hardware  failure/loosening. The vertebral bodies demonstrate no evidence  of an acute fracture. There is mild retrolisthesis at L3-4.    There are disc spacers at the L3-4 and L4-5 levels with marked  disc space narrowing at L2-3 and mild disc space narrowing at  L5-S1. There is mild central canal stenosis at L2/3.    Ancillary findings include a left renal cyst and interstitial  fibrosis in the lung bases.      Impression:      1. Postoperative changes as above without radiographic evidence  of hardware failure.  2. No evidence of an acute fracture.  3. Mild central canal stenosis at L2-3.    Electronically signed by:  Amarilis Gray MD  3/3/2022 1:56 PM CST  Workstation: [id number]    XR Chest 1 View [[id number]] Collected: 03/03/22 1018     Updated: 03/03/22 1048    Narrative:        PORTABLE CHEST    HISTORY: Cough. Shortness of air.    Portable AP supine semierect upright film of the chest was  obtained at.  COMPARISON: None    FINDINGS:   EKG leads.  Chronic obstructive pulmonary disease.  Minimal subsegmental infiltrates right midlung and right lung  base.  The heart is not enlarged.  The pulmonary vasculature is not increased.  No pleural effusion.  No pneumothorax.  No acute osseous abnormality.  Minimal dextroscoliosis thoracic spine.      Impression:      CONCLUSION:  Chronic obstructive pulmonary disease.  Minimal subsegmental infiltrates right midlung and right lung  base.    [id number]    Electronically signed by:  Rolo Velasco MD  3/3/2022 10:45 AM CST  Workstation: [id number]        I have personally reviewed  and interpreted the radiology studies and ECG obtained at time of admission.     Assessment / Plan     Assessment:   Active Hospital Problems    Diagnosis    • Non-traumatic rhabdomyolysis      Plan:   Rhabdomyolysis:  -LR IV hydration, repeat CPK in a.m., PT/OT evaluation.    Generalized muscle weakness rule out stroke versus malnutrition related:  - MRI brain ordered. Pt admits to recent MRI neck 6 months ago within normal limits without issues.  PT OT evaluation.  Fall precautions.  Ambulate with assistance.  Pt may require short-term rehab at time of hospital disposition.  Ultrasound carotids ordered.  Echocardiogram ordered.  Serial troponins.  Cardiac monitoring.  -He reports poor nutrition over several days.  Will consult nutrition, will order Ensure or boost with every meal.      Patient evaluation:  -Mild leukocytosis noted.  No overt signs of infection noted during admission evaluation.  Patient given Rocephin/azithromycin in emergency room.  We will not continue antibiotics at time of admission.  Blood cultures done at time of admission.  Low threshold to start antibiotics if patient develops high qSOFA or signs of early sepsis.    FEN cardiac diet  PPX Heparin subcutaneous  Code Status/Advanced Care Plan: Full code        The patient's surrogate decision maker is patient's son    I discussed my findings and recommendations with the patient, patient's son, and patient's family    Estimated length of stay is 1 to 3 days.     The patient was seen and examined by me on 3/3/2022 at 2 PM.    Electronically signed by Chandan Lee MD, 03/03/22, 19:38 CST.

## 2022-03-04 NOTE — PLAN OF CARE
Problem: Adult Inpatient Plan of Care  Goal: Plan of Care Review  3/4/2022 1206 by Cici Martin  Outcome: Ongoing, Progressing  Flowsheets (Taken 3/4/2022 1206)  Plan of Care Reviewed With: patient (Pended)   Goal Outcome Evaluation:         Pt reports low appetite d/t being tired and eating less than normal.  Pt reported wt loss and nausea (during stay).  Cardiac restrictions were removed.  Milk was added at BF and flavor preference was added to boost order.  [facility] staff will continue to monitor.

## 2022-03-04 NOTE — NURSING NOTE
Pt family concerned pt has not had arthritis meds restarted. [facility] contacted and home meds reviewed and updated. Dr Pineda notified that these had not been restarted and needed addressed. States he will look at it.

## 2022-03-04 NOTE — PROGRESS NOTES
[facility]  INPATIENT PROGRESS NOTE    Length of Stay: 1  Date of Admission: 3/3/2022  Primary Care Physician: Provider, No Known    Subjective   Chief Complaint: Generalized weakness  HPI:    Reports having generalized weakness.  Refusing to get out of bed    Review of Systems   Respiratory: Negative for shortness of breath.    Cardiovascular: Negative for chest pain.        All pertinent negatives and positives are as above. All other systems have been reviewed and are negative unless otherwise stated.     Objective    Temp:  [96.4 °F (35.8 °C)-98.2 °F (36.8 °C)] 97.8 °F (36.6 °C)  Heart Rate:  [] 114  Resp:  [18-22] 20  BP: (119-145)/(58-85) 138/85  Physical Exam  Vitals and nursing note reviewed.   Constitutional:       General: He is not in acute distress.     Appearance: He is well-developed. He is not diaphoretic.   HENT:      Head: Normocephalic and atraumatic.   Cardiovascular:      Rate and Rhythm: Normal rate.   Pulmonary:      Effort: Pulmonary effort is normal. No respiratory distress.      Breath sounds: No wheezing.   Abdominal:      General: There is no distension.      Palpations: Abdomen is soft.   Musculoskeletal:         General: Normal range of motion.   Skin:     General: Skin is warm and dry.   Neurological:      Mental Status: He is alert.      Cranial Nerves: No cranial nerve deficit.   Psychiatric:         Behavior: Behavior normal.         Thought Content: Thought content normal.         Judgment: Judgment normal.             Results Review:  I have reviewed the labs, radiology results, and diagnostic studies.    Laboratory Data:   Lab Results (last 24 hours)     Procedure Component Value Units Date/Time    Blood Culture - Blood, Arm, Left [[id number]]  (Abnormal) Collected: 03/03/22 1419    Specimen: Blood from Arm, Left Updated: 03/04/22 1314     Blood Culture Abnormal Stain     Gram Stain Aerobic Bottle Gram positive cocci  in clusters     Comment: Bottle 3 of 4 drawn positive for gram positive cocci         Anaerobic Bottle Gram positive cocci in clusters     Comment: 3 bottles of 4 drawn positive for gram positive cocci in clusters       CK [[id number]]  (Abnormal) Collected: 03/04/22 0648    Specimen: Blood Updated: 03/04/22 0818     Creatine Kinase 3,811 U/L     Comprehensive Metabolic Panel [[id number]]  (Abnormal) Collected: 03/04/22 0648    Specimen: Blood Updated: 03/04/22 0807     Glucose 105 mg/dL      BUN 36 mg/dL      Creatinine 1.06 mg/dL      Sodium 134 mmol/L      Potassium 4.0 mmol/L      Comment: Slight hemolysis detected by analyzer. Results may be affected.        Chloride 102 mmol/L      CO2 17.0 mmol/L      Calcium 8.4 mg/dL      Total Protein 5.9 g/dL      Albumin 2.50 g/dL      ALT (SGPT) 42 U/L      AST (SGOT) 114 U/L      Alkaline Phosphatase 49 U/L      Total Bilirubin 0.5 mg/dL      Globulin 3.4 gm/dL      A/G Ratio 0.7 g/dL      BUN/Creatinine Ratio 34.0     Anion Gap 15.0 mmol/L      eGFR 72.3 mL/min/1.73      Comment: National Kidney Foundation and American Society of Nephrology (ASN) Task Force recommended calculation based on the Chronic Kidney Disease Epidemiology Collaboration (CKD-EPI) equation refit without adjustment for race.       Narrative:      GFR Normal >60  Chronic Kidney Disease <60  Kidney Failure <15      Phosphorus [[id number]]  (Normal) Collected: 03/04/22 0648    Specimen: Blood Updated: 03/04/22 0807     Phosphorus 2.5 mg/dL     Magnesium [[id number]]  (Normal) Collected: 03/04/22 0648    Specimen: Blood Updated: 03/04/22 0807     Magnesium 2.1 mg/dL     Troponin [[id number]]  (Normal) Collected: 03/04/22 0648    Specimen: Blood Updated: 03/04/22 0804     Troponin T <0.010 ng/mL     Narrative:      Troponin T Reference Range:  <= 0.03 ng/mL-   Negative for AMI  >0.03 ng/mL-     Abnormal for myocardial necrosis.  Clinicians would have to utilize clinical acumen, EKG, Troponin and serial  changes to determine if it is an Acute Myocardial Infarction or myocardial injury due to an underlying chronic condition.       Results may be falsely decreased if patient taking Biotin.      CBC & Differential [[id number]]  (Abnormal) Collected: 03/04/22 0648    Specimen: Blood Updated: 03/04/22 0710    Narrative:      The following orders were created for panel order CBC & Differential.  Procedure                               Abnormality         Status                     ---------                               -----------         ------                     CBC Auto Differential[[id number]]        Abnormal            Final result                 Please view results for these tests on the individual orders.    CBC Auto Differential [[id number]]  (Abnormal) Collected: 03/04/22 0648    Specimen: Blood Updated: 03/04/22 0710     WBC 7.35 10*3/mm3      RBC 4.47 10*6/mm3      Hemoglobin 13.4 g/dL      Hematocrit 40.6 %      MCV 90.8 fL      MCH 30.0 pg      MCHC 33.0 g/dL      RDW 14.3 %      RDW-SD 47.5 fl      MPV 11.4 fL      Platelets 118 10*3/mm3      Neutrophil % 86.8 %      Lymphocyte % 4.1 %      Monocyte % 7.8 %      Eosinophil % 0.1 %      Basophil % 0.8 %      Immature Grans % 0.4 %      Neutrophils, Absolute 6.38 10*3/mm3      Lymphocytes, Absolute 0.30 10*3/mm3      Monocytes, Absolute 0.57 10*3/mm3      Eosinophils, Absolute 0.01 10*3/mm3      Basophils, Absolute 0.06 10*3/mm3      Immature Grans, Absolute 0.03 10*3/mm3      nRBC 0.0 /100 WBC     Blood Culture - Blood, Arm, Left [[id number]]  (Abnormal) Collected: 03/03/22 1323    Specimen: Blood from Arm, Left Updated: 03/04/22 0537     Blood Culture Abnormal Stain     Gram Stain Aerobic Bottle Gram positive cocci in clusters     Comment: Bottle 1 of 4 drawn positive for gram positive cocci         Anaerobic Bottle Gram positive cocci in clusters     Comment: Bottle 2 of 4 drawn positive for gram positive cocci       Blood Culture ID, PCR - Blood, Arm, Left  [[id number]]  (Abnormal) Collected: 03/03/22 1323    Specimen: Blood from Arm, Left Updated: 03/04/22 0331     BCID, PCR Staph aureus. mecA/C and MREJ (methicillin resistance gene) NOT detected. Identification by BCID2 PCR    Narrative:      Infectious disease consultation is highly recommended to rule out distant foci of infection.    Troponin [[id number]]  (Normal) Collected: 03/04/22 0004    Specimen: Blood Updated: 03/04/22 0059     Troponin T <0.010 ng/mL     Narrative:      Troponin T Reference Range:  <= 0.03 ng/mL-   Negative for AMI  >0.03 ng/mL-     Abnormal for myocardial necrosis.  Clinicians would have to utilize clinical acumen, EKG, Troponin and serial changes to determine if it is an Acute Myocardial Infarction or myocardial injury due to an underlying chronic condition.       Results may be falsely decreased if patient taking Biotin.      Troponin [[id number]]  (Normal) Collected: 03/03/22 2033    Specimen: Blood Updated: 03/03/22 2109     Troponin T <0.010 ng/mL     Narrative:      Troponin T Reference Range:  <= 0.03 ng/mL-   Negative for AMI  >0.03 ng/mL-     Abnormal for myocardial necrosis.  Clinicians would have to utilize clinical acumen, EKG, Troponin and serial changes to determine if it is an Acute Myocardial Infarction or myocardial injury due to an underlying chronic condition.       Results may be falsely decreased if patient taking Biotin.            Culture Data:   Blood Culture   Date Value Ref Range Status   03/03/2022 Abnormal Stain (C)  Preliminary   03/03/2022 Abnormal Stain (C)  Preliminary     No results found for: URINECX  No results found for: RESPCX  No results found for: WOUNDCX  No results found for: STOOLCX  No components found for: BODYFLD    Radiology Data:   Imaging Results (Last 24 Hours)     Procedure Component Value Units Date/Time    MRI Brain Without Contrast [[id number]] Collected: 03/04/22 0925     Updated: 03/04/22 1230    Narrative:      MRI BRAIN WITHOUT IV  CONTRAST    CLINICAL HISTORY:  76 years Male,Syncope, recurrent, M62.82  Rhabdomyolysis J18.9 Pneumonia, unspecified organism    COMPARISON: None    FINDINGS:  Axial, coronal, and sagittal images were obtained  without IV contrast.    T2/FLAIR hyperintense signal in the bilateral  periventricular/deep cerebral white matter is most compatible  with mild to moderate chronic small vessel ischemic change. No  abnormal restricted diffusion to indicate acute infarct. No mass  effect. Ventricular size is normal. No evidence for hemosiderin  deposition. The appropriate flow voids are noted about the skull  base.    The paranasal sinuses and mastoid air cells are clear.      Impression:      1. Mild/moderate chronic small vessel ischemic change.  2. No acute infarct.  3. No mass effect.  4. No hydrocephalus.    Electronically signed by:  Ronny Lee MD  3/4/2022 12:27 PM [facility]  Workstation: [id number]     Carotid Bilateral [[id number]] Collected: 03/04/22 0554     Updated: 03/04/22 0653    Narrative:      PROCEDURE: Bilateral carotid artery Doppler    COMPARISON: No comparison    HISTORY: Recurrent syncope    FINDINGS: Realtime grayscale and color flow images as well as  spectral waveform analysis is performed of the carotid arteries.    There is mild atherosclerotic disease in the right bulb and  proximal internal carotid artery. There is mild-to-moderate  atherosclerotic disease in the left bulb and proximal internal  carotid artery.    Right: The peak systolic velocity in the right common carotid  artery is 61.2 cm/sec. The peak systolic velocity in the right  internal carotid artery is 109.2 cm/sec. The IC/CC ratio is 1.8.  Antegrade flow is present in the right vertebral artery.    Left: The peak systolic velocity in the left common carotid  artery is 61.1 cm/sec. The peak systolic velocity in the left  internal carotid artery is 196.8 cm/sec. The IC/CC ratio is 3.2.  Antegrade flow is present in the left vertebral  artery.      Impression:      1. Mild right and mild-to-moderate left atherosclerotic disease  in the region of the bilateral bulb/internal carotid arteries.   2. No significant flow-limiting stenosis by velocity measurements  in the right ICA.  3. There is 50-69% of diameter narrowing in the left ICA.    Criteria For Estimating Internal Carotid Stenosis  ICA-PSV:  Normal------------------<125 cm/sec  <50%--------------------<125 cm/sec  50 - 69%---------------125-230 cm/sec  >70%-------------------->230 cm/sec  Near/Occlusion-------Variable  Total/Occlusion-------No Flow    ICA/CCA PSV Ratio:  Normal------------------<2.0  <50%--------------------<2.0  50 - 69%----------------2.0 - 4.0  >70%-------------------->4.0  Near/Occlusion--------Variable  Total/Occlusion--------No Flow    ICA - EDV:  Normal-------------------<40 cm/sec  <50%---------------------<40 cm/sec  50 - 69%-----------------40 - 100 cm/sec  >70%--------------------->100 cm/sec  Near/Occlusion---------Variable  Total/Occlusion---------No Flow    Kwaku EG, Jean CB, Roseanna GL, et al: Society of Radiologists in  Ultrasound Consensus Conference on Ultrasound and Doppler  Diagnosis of Carotid Stenosis. Radiology, Nov 2003.    Electronically signed by:  Damien Ahn MD  3/4/2022 6:50 AM CST  Workstation: [id number]          I have reviewed the patient's current medications.     Assessment/Plan     Active Hospital Problems    Diagnosis    • Non-traumatic rhabdomyolysis        Rhabdomyolysis-continue with IV hydration and continue monitoring CPK    Generalized weakness-PT OT ordered, MRI of brain negative for any acute changes    Bacteremia-IV antibiotics will be continued.  Continue with awaiting final cultures    DVT prophylaxis-SCD    Patient is full code    I confirmed that the patient's Advance Care Plan is present, code status is documented, or surrogate decision maker is listed in the patient's medical record.         Kaveh Pineda MD   03/04/22    14:46 CST

## 2022-03-04 NOTE — CONSULTS
"Adult Nutrition  Assessment    Patient Name:  Tito Martin  YOB: 1945  MRN: [id number]  Admit Date:  3/3/2022    Assessment Date:  3/4/2022    Comments:  Pt admitted with non-traumatic rhabdomyolysis, and presented to the hospital after having several falls and back pain.  RDN staff was consulted for pt's poor oral intake and MST of 2 r/t weight loss and decreased PO.  Pt states his appetite is not good because he is tired and he is eating less than he normally would.  Pt denies chewing/swallowing difficulty, change is bowel movements, or food allergies.  Pt reports a wt loss of 10-15 lbs in the last 6 months and his UBW is 175-180 lbs.  CBW is 168 lbs.  Pt reports having n/v while admitted but is being treated.  Talked with pt about supplements-pt agreed to milk at BF and pt is already receiving boost 3 q day (pt stated he likes strawberry).  Cardiac restrictions will be removed d/t no PMhx and no current complications.  RDN staff will continue to monitor.      Reason for Assessment     Row Name 03/04/22 1141          Reason for Assessment    Reason For Assessment physician consult (P)      Diagnosis other (see comments) (P)   non-traumatic rhabdomyolysis     Identified At Risk by Screening Criteria MST SCORE 2+; reduced oral intake over the last month; unintentional loss of 10 lbs or more in the past 2 mos (P)                 Nutrition/Diet History     Row Name 03/04/22 1146          Nutrition/Diet History    Typical Food/Fluid Intake Pt reports a low appetite and eating less than normal d/t being tired (P)      Supplemental Drinks/Foods/Additives receiving boost 3 q day (P)      Factors Affecting Nutritional Intake nausea (P)                 Anthropometrics     Row Name 03/04/22 1152 03/04/22 1148       Anthropometrics    Height 180.3 cm (70.98\") (P)  180.3 cm (70.98\") (P)     Weight -- 76.2 kg (167 lb 15.9 oz) (P)        Ideal Body Weight (IBW)    Ideal Body Weight (IBW) (kg) 79.23 " "(P)  79.23 (P)     % Ideal Body Weight -- 96.18 (P)        Usual Body Weight (UBW)    Usual Body Weight -- 80.7 kg (177 lb 14.6 oz) (P)     % Usual Body Weight -- 94.42 (P)     % of Usual Body Weight Assessment -- 85-95% - mild deficit (P)     Weight Loss -- unintentional (P)     Weight Loss Time Frame -- 6 months (P)        Body Mass Index (BMI)    BMI (kg/m2) -- 23.49 (P)     BMI Assessment -- BMI 18.5-24.9: normal (P)                Labs/Tests/Procedures/Meds     Row Name 03/04/22 1150          Labs/Procedures/Meds    Lab Results Reviewed reviewed (P)      Lab Results Comments Na 134, Glu 105, BUN 36, Platelets 118, ALT 42, Alb 2.5 (P)             Diagnostic Tests/Procedures    Diagnostic Test/Procedure Reviewed reviewed (P)             Medications    Pertinent Medications Reviewed reviewed (P)      Pertinent Medications Comments sodium bicarb @ 75 mEq (P)                   Estimated/Assessed Needs     Row Name 03/04/22 1152 03/04/22 1148       Calculation Measurements    Weight Used For Calculations 76.2 kg (167 lb 15.9 oz) (P)  --    Height 180.3 cm (70.98\") (P)  180.3 cm (70.98\") (P)        Estimated/Assessed Needs    Additional Documentation KCAL/KG (Group); [place]-St. Jeor Equation (Group); Protein Requirements (Group) (P)  --       KCAL/KG    KCAL/KG 20 Kcal/Kg (kcal); 25 Kcal/Kg (kcal) (P)  --    20 Kcal/Kg (kcal) 1524 (P)  --    25 Kcal/Kg (kcal) 1905 (P)  --       [place]-St. Jeor Equation    RMR ([place]-St. Jeor Equation) 1508.871 (P)  --    [place]-St. Jeor Activity Factors 1.2 (P)  --    Activity Factors ([place]-St. Jeor) 6396.7297 (P)  --       Protein Requirements    Weight Used For Protein Calculations 76.2 kg (167 lb 15.9 oz) (P)  --    Est Protein Requirement Amount (gms/kg) 1.0 gm protein (P)  --    Estimated Protein Requirements (gms/day) 76.2 (P)  --               Nutrition Prescription Ordered     Row Name 03/04/22 1152          Nutrition Prescription PO    Current PO Diet Regular (P)      " Supplement Boost Plus (Ensure Enlive, Ensure Plus) (P)      Supplement Frequency 3 times a day (P)      Common Modifiers Cardiac (P)                 Evaluation of Received Nutrient/Fluid Intake     Row Name 03/04/22 1152          PO Evaluation    Number of Days PO Intake Evaluated Insufficient Data (P)                      Electronically signed by:  Cici Martin  03/04/22 11:54 CST

## 2022-03-04 NOTE — TREATMENT PLAN
Patient was found to have 1 of 4 blood cultures growing staph aureus that is MSSA    Plan:  Started the patient on IV cefazolin, which should be sufficient coverage, although we will await the sensitivities          I have also commenced the patient on sodium bicarbonate due to the high CK in the context the patient's rhabdomyolysis, which will help to speed up the resolution and subsequent COLIN

## 2022-03-05 NOTE — THERAPY EVALUATION
Acute Care - Occupational Therapy Initial Evaluation  [facility]     Patient Name: Tito Martin  : 1945  MRN: [id number]  Today's Date: 3/5/2022     Date of Referral to OT: 22       Admit Date: 3/3/2022       ICD-10-CM ICD-9-CM   1. Non-traumatic rhabdomyolysis  M62.82 728.88   2. Pneumonia of right lung due to infectious organism, unspecified part of lung  J18.9 483.8   3. Impaired mobility and activities of daily living  Z74.09 V49.89    Z78.9      Patient Active Problem List   Diagnosis   • Non-traumatic rhabdomyolysis     Past Medical History:   Diagnosis Date   • Cancer (HCC)     skin   • Carpal tunnel syndrome    • GERD (gastroesophageal reflux disease)    • History of blood transfusion    • Rheumatoid arthritis (HCC)      Past Surgical History:   Procedure Laterality Date   • BACK SURGERY     • CARDIAC CATHETERIZATION     • CARPAL TUNNEL RELEASE     • EYE SURGERY     • OTHER SURGICAL HISTORY      lumbar disc fusion   • TRIGGER FINGER RELEASE           OT ASSESSMENT FLOWSHEET (last 12 hours)     OT Evaluation and Treatment     Row Name 22 1540                   OT Time and Intention    Subjective Information complains of;pain  -RB        Document Type evaluation  -RB        Mode of Treatment co-treatment;physical therapy;occupational therapy  -RB        Patient Effort adequate  -RB                  General Information    Patient Profile Reviewed yes  -RB        Prior Level of Function independent:;all household mobility;gait;transfer;ADL's;driving;home management  -RB        Existing Precautions/Restrictions fall;spinal  -RB                  Previous Level of Function/Home Environm    Bathing/Grooming, Premorbid Functional Level independent  -RB        Dressing, Premorbid Functional Level independent  -RB        Eating/Feeding, Premorbid Functional Level independent  -RB        Toileting, Premorbid Functional Level independent  -RB                  Living Environment     People in Home alone  -RB                  Home Main Entrance    Number of Stairs, Main Entrance two  -RB        Stair Railings, Main Entrance railings safe and in good condition  -RB                  Stairs Within Home, Primary    Number of Stairs, Within Home, Primary none  -RB        Stair Railings, Within Home, Primary none  -RB                  Home Use of Assistive/Adaptive Equipment    Equipment Currently Used at Home walker, rolling  -RB                  Pain Assessment    Pretreatment Pain Rating 10/10  -RB        Pain Location lower  -RB        Pain Location - back  -RB        Pre/Posttreatment Pain Comment Pt given pain meds.  -RB                  Cognition    Orientation Status (Cognition) oriented x 4  -RB                  Range of Motion Comprehensive    General Range of Motion upper extremity range of motion deficits identified  -RB        Comment, General Range of Motion L shld flex 20* and R shld flex 80* and rest of B UE AROM was WNL  -RB                  Strength Comprehensive (MMT)    General Manual Muscle Testing (MMT) Assessment other (see comments)  -RB        Comment, General Manual Muscle Testing (MMT) Assessment 2-/5 for B shld flex and rest of B UE strength was 3+/5 grossly.  -RB                  Activities of Daily Living    BADL Assessment/Intervention grooming  -RB                  Grooming Assessment/Training    [place] Level (Grooming) grooming skills;hair care, combing/brushing;minimum assist (75% patient effort);moderate assist (50% patient effort)  -RB        Assistive Devices (Grooming) hand over hand  -RB        Position (Grooming) supine  -RB                  Bed Mobility    Bed Mobility bed mobility (all) activities  -RB        All Activities, [place] (Bed Mobility) not tested  Pt deferr due to back pain and fatigue.  -RB                  Functional Mobility    Functional Mobility- Ind. Level not tested  -RB                  Safety Issues, Functional Mobility     Comment, Safety Issues/Impairments (Mobility) Uses RW and tub/shower.  -RB                  Vital Signs    Pre Systolic BP Rehab 115  -RB        Pre Treatment Diastolic BP 68  -RB        Pretreatment Heart Rate (beats/min) 99  -RB        Posttreatment Heart Rate (beats/min) 104  -RB        Pre SpO2 (%) 96  -RB        O2 Delivery Pre Treatment room air  -RB        Post SpO2 (%) 98  -RB        O2 Delivery Post Treatment room air  -RB        Pre Patient Position Supine  -RB        Intra Patient Position Supine  -RB        Post Patient Position Supine  -RB                  Positioning and Restraints    Pre-Treatment Position in bed  -RB        Post Treatment Position bed  -RB        In Bed supine;call light within reach;encouraged to call for assist;exit alarm on;with family/caregiver  -RB                  Therapy Assessment/Plan (OT)    Date of Referral to OT 03/03/22  -RB        Patient/Family Therapy Goal Statement (OT) Brother in room  -RB        Functional Level at Time of Evaluation (OT) Impaired mob and ADLs.  -RB        OT Diagnosis Imp mob and ADLs.  -RB        Rehab Potential (OT) fair, will monitor progress closely  -RB        Criteria for Skilled Therapeutic Interventions Met (OT) yes;meets criteria  -RB        Therapy Frequency (OT) other (see comments)  3-7 days/wk  -RB        Predicted Duration of Therapy Intervention (OT) Until D/C or goals met.  -RB        Problem List (OT) problems related to;balance;coordination;mobility;motor control;muscle tone;range of motion (ROM);strength;inability to direct their own care;hearing  -RB        Activity Limitations Related to Problem List (OT) unable to ambulate safely;unable to transfer safely;BADLs not performed adequately or safely;IADLs not performed adequately or safely  -RB        Planned Therapy Interventions (OT) activity tolerance training;adaptive equipment training;BADL retraining;functional balance retraining;occupation/activity based  interventions;patient/caregiver education/training;ROM/therapeutic exercise;strengthening exercise;transfer/mobility retraining  -RB                  Evaluation Complexity (OT)    Review Occupational Profile/Medical/Therapy History Complexity expanded/moderate complexity  -RB        Assessment, Occupational Performance/Identification of Deficit Complexity 3-5 performance deficits  -RB        Clinical Decision Making Complexity (OT) detailed assessment/moderate complexity  -RB        Overall Complexity of Evaluation (OT) moderate complexity  -RB                  Therapy Plan Review/Discharge Plan (OT)    Anticipated Discharge Disposition (OT) inpatient rehabilitation facility;[facility] ([facility])  -RB                  OT Goals    Transfer Goal Selection (OT) transfer, OT goal 1  -RB        Bathing Goal Selection (OT) bathing, OT goal 1  -RB        Dressing Goal Selection (OT) dressing, OT goal 1  -RB        Grooming Goal Selection (OT) grooming, OT goal 1  -RB        Self-Feeding Goal Selection (OT) self-feeding, OT goal 1  -RB                  Transfer Goal 1 (OT)    Activity/Assistive Device (Transfer Goal 1, OT) transfers, all  -RB        [place] Level/Cues Needed (Transfer Goal 1, OT) contact guard required  -RB        Time Frame (Transfer Goal 1, OT) long term goal (LTG)  -RB        Progress/Outcome (Transfer Goal 1, OT) goal not met  -RB                  Bathing Goal 1 (OT)    Activity/Device (Bathing Goal 1, OT) upper body bathing  -RB        [place] Level/Cues Needed (Bathing Goal 1, OT) contact guard required  -RB        Time Frame (Bathing Goal 1, OT) long term goal (LTG)  -RB        Progress/Outcomes (Bathing Goal 1, OT) goal not met  -RB                  Dressing Goal 1 (OT)    Activity/Device (Dressing Goal 1, OT) upper body dressing  -RB        [place]/Cues Needed (Dressing Goal 1, OT) contact guard required  -RB        Time Frame (Dressing Goal 1, OT) long term goal (LTG)  -RB         Progress/Outcome (Dressing Goal 1, OT) goal not met  -RB                  Grooming Goal 1 (OT)    Activity/Device (Grooming Goal 1, OT) hair care;oral care  -RB        [place] (Grooming Goal 1, OT) supervision required  -RB        Time Frame (Grooming Goal 1, OT) long term goal (LTG)  -RB        Progress/Outcome (Grooming Goal 1, OT) goal not met  -RB                  Self-Feeding Goal 1 (OT)    Activity/Device (Self-Feeding Goal 1, OT) self-feeding skills, all  -RB        [place] Level/Cues Needed (Self-Feeding Goal 1, OT) supervision required  -RB        Time Frame (Self-Feeding Goal 1, OT) long term goal (LTG)  -RB        Progress/Outcomes (Self-Feeding Goal 1, OT) goal not met  -RB              User Key  (r) = Recorded By, (t) = Taken By, (c) = Cosigned By    Initials Name Effective Dates    RB Fran Jain, RANDI 06/16/21 -                  Occupational Therapy Education                 Title: PT OT SLP Therapies (In Progress)     Topic: Occupational Therapy (In Progress)     Point: ADL training (Done)     Description:   Instruct learner(s) on proper safety adaptation and remediation techniques during self care or transfers.   Instruct in proper use of assistive devices.              Learning Progress Summary           Patient Acceptance, E, VU,NR by RB at 3/5/2022 4661    Comment: [facility] pt on grooming skills and purpose of occupational therapy.                   Point: Home exercise program (Not Started)     Description:   Instruct learner(s) on appropriate technique for monitoring, assisting and/or progressing therapeutic exercises/activities.              Learner Progress:  Not documented in this visit.          Point: Precautions (Not Started)     Description:   Instruct learner(s) on prescribed precautions during self-care and functional transfers.              Learner Progress:  Not documented in this visit.          Point: Body mechanics (Not Started)     Description:   Instruct learner(s) on  proper positioning and spine alignment during self-care, functional mobility activities and/or exercises.              Learner Progress:  Not documented in this visit.                      User Key     Initials Effective Dates Name Provider Type Discipline    RB 06/16/21 -  Fran Jain, RANDI Occupational Therapist OT                  OT Recommendation and Plan  Planned Therapy Interventions (OT): activity tolerance training, adaptive equipment training, BADL retraining, functional balance retraining, occupation/activity based interventions, patient/caregiver education/training, ROM/therapeutic exercise, strengthening exercise, transfer/mobility retraining  Therapy Frequency (OT): other (see comments) (3-7 days/wk)  Plan of Care Review  Plan of Care Reviewed With: patient, sibling  Outcome Evaluation: OT eval on this date as co-eval with PT.  Pt agreed to bed eval only today due to fatigue and back pain.  Pt was samantha to mod A for grooming.  He states he was independent with ADLs and functional mobility previously.  He could benefit from OT services for increased independence with ADLs and functional mobility. Pt may benefit from IRF / SNF to home with assist.  Plan of Care Reviewed With: patient, sibling  Outcome Evaluation: OT eval on this date as co-eval with PT.  Pt agreed to bed eval only today due to fatigue and back pain.  Pt was samantha to mod A for grooming.  He states he was independent with ADLs and functional mobility previously.  He could benefit from OT services for increased independence with ADLs and functional mobility. Pt may benefit from IRF / SNF to home with assist.     Outcome Measures     Row Name 03/05/22 1223             How much help from another is currently needed...    Putting on and taking off regular lower body clothing? 1  -RB      Bathing (including washing, rinsing, and drying) 1  -RB      Toileting (which includes using toilet bed pan or urinal) 1  -RB      Putting on and taking off  regular upper body clothing 1  -RB      Taking care of personal grooming (such as brushing teeth) 2  -RB      Eating meals 2  -RB      AM-PAC 6 Clicks Score (OT) 8  -RB              Functional Assessment    Outcome Measure Options AM-PAC 6 Clicks Daily Activity (OT)  -RB            User Key  (r) = Recorded By, (t) = Taken By, (c) = Cosigned By    Initials Name Provider Type    RB Fran Jain OT Occupational Therapist                Time Calculation:    Time Calculation- OT     Row Name 03/05/22 1642             Time Calculation- OT    OT Start Time 1540  -RB      OT Stop Time 1614  -RB      OT Time Calculation (min) 34 min  -RB      OT Received On 03/05/22  -RB      OT Goal Re-Cert Due Date 03/18/22  -RB            User Key  (r) = Recorded By, (t) = Taken By, (c) = Cosigned By    Initials Name Provider Type    RB Fran Jain OT Occupational Therapist              Therapy Charges for Today     Code Description Service Date Service Provider Modifiers Qty    [id number] HC OT EVAL MOD COMPLEXITY 2 3/5/2022 Fran Jain OT GO 1               Fran Jain OT  3/5/2022

## 2022-03-05 NOTE — PLAN OF CARE
Goal Outcome Evaluation:  Plan of Care Reviewed With: patient, sibling        Progress: no change  Outcome Evaluation: PT/OT co theresa completed within what he allowed for mobilty today. He is very sore and felt he needed to have couple days of rest before starting therapy and wants to just let nursing reposition him for now PT/brother informed there are risks to staying in bed/not getting up but he felt he was fine w/ nursing repositioning him for now. He has recent hx falls at home x 3 with falling on face/shoulder per pt. He states he was aware he was falling each time, the first time was w/ cane sliding out from before him the next 2 were falls in general.  This is major change from his PLOF of indep mobiity and self care and golfing 9 holes/game on average pre-admit.  He will need extensive rehab to help attempt return to indep mobiity w/ safety; If he can progress to allowing mobility w /therapy he could benefit from IRF or skilled rehab to home, but to return to home alone will not be likely in short run.

## 2022-03-05 NOTE — PLAN OF CARE
Goal Outcome Evaluation:  Plan of Care Reviewed With: patient, sibling           Outcome Evaluation: OT eval on this date as co-eval with PT.  Pt agreed to bed eval only today due to fatigue and back pain.  Pt was samantha to mod A for grooming.  He states he was independent with ADLs and functional mobility previously.  He could benefit from OT services for increased independence with ADLs and functional mobility. Pt may benefit from IRF / SNF to home with assist.

## 2022-03-05 NOTE — THERAPY EVALUATION
Patient Name: Tito Martin  : 1945    MRN: [id number]                              Today's Date: 3/5/2022     PT Evaluation   Admit Date: 3/3/2022    Visit Dx:     ICD-10-CM ICD-9-CM   1. Non-traumatic rhabdomyolysis  M62.82 728.88   2. Pneumonia of right lung due to infectious organism, unspecified part of lung  J18.9 483.8   3. Impaired mobility and activities of daily living  Z74.09 V49.89    Z78.9    4. Impaired functional mobility, balance, gait, and endurance  Z74.09 V49.89     Patient Active Problem List   Diagnosis   • Non-traumatic rhabdomyolysis     Past Medical History:   Diagnosis Date   • Cancer (HCC)     skin   • Carpal tunnel syndrome    • GERD (gastroesophageal reflux disease)    • History of blood transfusion    • Rheumatoid arthritis (HCC)      Past Surgical History:   Procedure Laterality Date   • BACK SURGERY     • CARDIAC CATHETERIZATION     • CARPAL TUNNEL RELEASE     • EYE SURGERY     • OTHER SURGICAL HISTORY      lumbar disc fusion   • TRIGGER FINGER RELEASE        General Information     Row Name 22 1543          Physical Therapy Time and Intention    Mode of Treatment co-treatment;physical therapy;occupational therapy  -     Row Name 22 1543          General Information    Patient Profile Reviewed yes  -GB     Prior Level of Function independent:;all household mobility;community mobility;driving;using stairs;ADL's;bathing;dressing;cooking;cleaning;shopping;grooming;gait;yard work;home management  -GB     Existing Precautions/Restrictions fall;spinal  -GB     Row Name 22 1543          Living Environment    People in Home alone  -     Row Name 22 1543          Home Main Entrance    Number of Stairs, Main Entrance two  -GB     Stair Railings, Main Entrance none  -GB     Row Name 22 1543          Stairs Within Home, Primary    Number of Stairs, Within Home, Primary none  -GB     Stair Railings, Within Home, Primary none  -GB     Row Name  03/05/22 1543          Cognition    Orientation Status (Cognition) oriented x 4  -GB     Row Name 03/05/22 1543          Safety Issues, Functional Mobility    Comment, Safety Issues/Impairments (Mobility) uses RW, tub showere /wout seat; usually takes bath in tub; no problems getting out of bath; no NSM;  -GB           User Key  (r) = Recorded By, (t) = Taken By, (c) = Cosigned By    Initials Name Provider Type    Analilia Bartholomew PT Physical Therapist               Mobility     Row Name 03/05/22 1624 03/05/22 1606       Bed Mobility    Bed Mobility bed mobility (all) activities  -GB bed mobility (all) activities  -GB    All Activities, [place] (Bed Mobility) not tested  -GB not tested  -GB    Row Name 03/05/22 1624 03/05/22 1606       Bed-Chair Transfer    Bed-Chair [place] (Transfers) not tested  -GB not tested  -GB    Row Name 03/05/22 1624 03/05/22 1606       Sit-Stand Transfer    Sit-Stand [place] (Transfers) not tested  -GB not tested  -GB    Row Name 03/05/22 1624 03/05/22 1606       Gait/Stairs (Locomotion)    [place] Level (Gait) not tested  -GB not tested  -GB    Comment, (Gait/Stairs) -- pt states he is not getting any rest so he does not want to move w/ therapy for a couple days. He is ok for nursing to hlep move him but doesn't want to work w therapy til he heals a little on his own.Pt/brother informed of risk of not moving to inclue but not be limited to PNE, bedsores, blood clots and strength loss of 1.5%/day of bedrest. Pt feels his pain is so limiting that he doesn't want to move yet.  -          User Key  (r) = Recorded By, (t) = Taken By, (c) = Cosigned By    Initials Name Provider Type    Analilia Bartholomew PT Physical Therapist               Obj/Interventions     Row Name 03/05/22 1626          Range of Motion Comprehensive    Comment, General Range of Motion pt initially did not want PT to move LEs but did allow AAROM to ~50 deg hip & knee flx/ext  and DF/PF was actively WFL; L LE on pillow holding knee in slight flx and pillow repositioned to hold him supported better but that was all the motion he agreed to do.  -     Row Name 03/05/22 1626          Strength Comprehensive (MMT)    Comment, General Manual Muscle Testing (MMT) Assessment at least 3-/5 knee ext emir while LE supported; hip flx was AAROM emir when he allowed AA-PROM emir LEs. DF/PF 4/5 emir tho LLE slower to respond and resist; Pt states sensation decreased and strength on LLE prior to testing;  -     Row Name 03/05/22 1626          Balance    Comment, Balance unable to assess due to not allowing sup<>sit or rolling; did not consent to repositioning but states he arelis have nursing to do it;  -     Row Name 03/05/22 1626          Sensory Assessment (Somatosensory)    Sensory Assessment (Somatosensory) LE sensation intact  -           User Key  (r) = Recorded By, (t) = Taken By, (c) = Cosigned By    Initials Name Provider Type    GB Analilia Naranjo, PT Physical Therapist               Goals/Plan     Row Name 03/05/22 1551          Bed Mobility Goal 1 (PT)    Activity/Assistive Device (Bed Mobility Goal 1, PT) bed mobility activities, all  -GB     [place] Level/Cues Needed (Bed Mobility Goal 1, PT) minimum assist (75% or more patient effort)  -GB     Time Frame (Bed Mobility Goal 1, PT) 10 days  -GB     Progress/Outcomes (Bed Mobility Goal 1, PT) goal not met  -     Row Name 03/05/22 1551          Transfer Goal 1 (PT)    Activity/Assistive Device (Transfer Goal 1, PT) bed-to-chair/chair-to-bed;toilet  -     [place] Level/Cues Needed (Transfer Goal 1, PT) minimum assist (75% or more patient effort)  -GB     Time Frame (Transfer Goal 1, PT) 10 days  -GB     Progress/Outcome (Transfer Goal 1, PT) goal not met  -     Row Name 03/05/22 1551          Gait Training Goal 1 (PT)    Activity/Assistive Device (Gait Training Goal 1, PT) gait (walking locomotion);assistive device  use  -GB     [place] Level (Gait Training Goal 1, PT) minimum assist (75% or more patient effort)  -GB     Time Frame (Gait Training Goal 1, PT) 10 days  -GB     Progress/Outcome (Gait Training Goal 1, PT) goal not met  -GB     Row Name 03/05/22 1551          Stairs Goal 1 (PT)    Activity/Assistive Device (Stairs Goal 1, PT) ascending stairs;descending stairs  -GB     [place] Level/Cues Needed (Stairs Goal 1, PT) modified independence  -GB     Time Frame (Stairs Goal 1, PT) long term goal (LTG);30 days  -GB     Progress/Outcome (Stairs Goal 1, PT) goal not met  -GB           User Key  (r) = Recorded By, (t) = Taken By, (c) = Cosigned By    Initials Name Provider Type    Analilia Bartholomew, PT Physical Therapist               Clinical Impression     Row Name 03/05/22 1549          Pain    Pretreatment Pain Rating 10/10  -GB     Posttreatment Pain Rating 10/10  -GB     Pain Location lower  -GB     Pain Location - back  -GB     Pre/Posttreatment Pain Comment Nsg gave pain meds in eval  -GB     Row Name 03/05/22 8286          Plan of Care Review    Plan of Care Reviewed With patient;sibling  -GB     Progress no change  -GB     Outcome Evaluation PT/OT co theresa completed within what he allowed for mobilty today. He is very sore and felt he needed to have couple days of rest before starting therapy and wants to just let nursing reposition him for now PT/brother informed there are risks to staying in bed/not getting up but he felt he was fine w/ nursing repositioning him for now. He has recent hx falls at home x 3 with falling on face/shoulder per pt. He states he was aware he was falling each time, the first time was w/ cane sliding out from before him the next 2 were falls in general.  This is major change from his PLOF of indep mobiity and self care and golfing 9 holes/game on average pre-admit.  He will need extensive rehab to help attempt return to indep mobiity w/ safety; If he can progress to  allowing mobility w /therapy he could benefit from IRF or skilled rehab to home, but to return to home alone will not be likely in short run.  -GB     Row Name 03/05/22 1546          Therapy Assessment/Plan (PT)    Rehab Potential (PT) fair, will monitor progress closely  -GB     Criteria for Skilled Interventions Met (PT) yes  -GB     Row Name 03/05/22 1546          Vital Signs    Pre Systolic BP Rehab 115  -GB     Pre Treatment Diastolic BP 68  -GB     Pretreatment Heart Rate (beats/min) 99  -GB     Posttreatment Heart Rate (beats/min) 104  -GB     Pre SpO2 (%) 96  -GB     O2 Delivery Pre Treatment room air  -GB     Post SpO2 (%) 98  -GB     O2 Delivery Post Treatment room air  -GB     Pre Patient Position Supine  -GB     Intra Patient Position Supine  -GB     Post Patient Position Supine  -GB     Row Name 03/05/22 1546          Positioning and Restraints    Pre-Treatment Position in bed  -GB     Post Treatment Position bed  -GB     In Bed supine;call light within reach;encouraged to call for assist;side rails up x1;side rails up x2;with family/caregiver  -GB           User Key  (r) = Recorded By, (t) = Taken By, (c) = Cosigned By    Initials Name Provider Type    GB Analilia Naranjo, PT Physical Therapist               Outcome Measures     Row Name 03/05/22 1629          How much help from another person do you currently need...    Turning from your back to your side while in flat bed without using bedrails? 1  -GB     Moving from lying on back to sitting on the side of a flat bed without bedrails? 1  -GB     Moving to and from a bed to a chair (including a wheelchair)? 1  -GB     Standing up from a chair using your arms (e.g., wheelchair, bedside chair)? 1  -GB     Climbing 3-5 steps with a railing? 1  -GB     To walk in hospital room? 1  -GB     AM-PAC 6 Clicks Score (PT) 6  -GB     Row Name 03/05/22 1629 03/05/22 1540       Functional Assessment    Outcome Measure Options AM-PAC 6 Clicks Basic  Mobility (PT)  -DEREK AM-PAC 6 Clicks Daily Activity (OT)  -CHRISTINE          User Key  (r) = Recorded By, (t) = Taken By, (c) = Cosigned By    Initials Name Provider Type    Fran Sargent OT Occupational Therapist    Analilia Bartholomew E, PT Physical Therapist                             Physical Therapy Education                 Title: PT OT SLP Therapies (In Progress)     Topic: Physical Therapy (Done)     Point: Mobility training (Done)     Learning Progress Summary           Patient Acceptance, E, NR,DU,VU by DEREK at 3/5/2022 1640    Comment: POC: pt informed of risks of blood clots, pressure ulcers, PNE and loss of strength at possibly 1.5% if not getitng up/moving; he verbalized understanding but stated he felt he needed a little healing on own before moving for now   Family Acceptance, E, NR,DU,VU by DEREK at 3/5/2022 1640    Comment: POC: pt informed of risks of blood clots, pressure ulcers, PNE and loss of strength at possibly 1.5% if not getitng up/moving; he verbalized understanding but stated he felt he needed a little healing on own before moving for now                   Point: Home exercise program (Done)     Learning Progress Summary           Patient Acceptance, E, NR,DU,VU by DEREK at 3/5/2022 1640    Comment: POC: pt informed of risks of blood clots, pressure ulcers, PNE and loss of strength at possibly 1.5% if not getitng up/moving; he verbalized understanding but stated he felt he needed a little healing on own before moving for now   Family Acceptance, E, NR,DU,VU by DEREK at 3/5/2022 1640    Comment: POC: pt informed of risks of blood clots, pressure ulcers, PNE and loss of strength at possibly 1.5% if not getitng up/moving; he verbalized understanding but stated he felt he needed a little healing on own before moving for now                   Point: Body mechanics (Done)     Learning Progress Summary           Patient Acceptance, E, NR,DU,VU by DEREK at 3/5/2022 1640    Comment: POC: pt informed of  risks of blood clots, pressure ulcers, PNE and loss of strength at possibly 1.5% if not getitng up/moving; he verbalized understanding but stated he felt he needed a little healing on own before moving for now   Family Acceptance, E, NR,DU,VU by  at 3/5/2022 1640    Comment: POC: pt informed of risks of blood clots, pressure ulcers, PNE and loss of strength at possibly 1.5% if not getitng up/moving; he verbalized understanding but stated he felt he needed a little healing on own before moving for now                   Point: Precautions (Done)     Learning Progress Summary           Patient Acceptance, E, NR,DU,VU by DEREK at 3/5/2022 1640    Comment: POC: pt informed of risks of blood clots, pressure ulcers, PNE and loss of strength at possibly 1.5% if not getitng up/moving; he verbalized understanding but stated he felt he needed a little healing on own before moving for now   Family Acceptance, E, NR,DU,VU by DEREK at 3/5/2022 1640    Comment: POC: pt informed of risks of blood clots, pressure ulcers, PNE and loss of strength at possibly 1.5% if not getitng up/moving; he verbalized understanding but stated he felt he needed a little healing on own before moving for now                               User Key     Initials Effective Dates Name Provider Type Discipline     06/16/21 -  Analilia Naranjo, PT Physical Therapist PT              PT Recommendation and Plan     Plan of Care Reviewed With: patient, sibling  Progress: no change  Outcome Evaluation: PT/OT co theresa completed within what he allowed for mobilty today. He is very sore and felt he needed to have couple days of rest before starting therapy and wants to just let nursing reposition him for now PT/brother informed there are risks to staying in bed/not getting up but he felt he was fine w/ nursing repositioning him for now. He has recent hx falls at home x 3 with falling on face/shoulder per pt. He states he was aware he was falling each time, the  first time was w/ cane sliding out from before him the next 2 were falls in general.  This is major change from his PLOF of indep mobiity and self care and golfing 9 holes/game on average pre-admit.  He will need extensive rehab to help attempt return to indep mobiity w/ safety; If he can progress to allowing mobility w /therapy he could benefit from IRF or skilled rehab to home, but to return to home alone will not be likely in short run.     Time Calculation:    PT Charges     Row Name 03/05/22 1543             Time Calculation    Start Time 1543  -GB      Stop Time 1614  -GB      Time Calculation (min) 31 min  -GB      PT Received On 03/05/22  -GB      PT Goal Re-Cert Due Date 03/18/22  -GB              Untimed Charges    PT Eval/Re-eval Minutes 31  -GB              Total Minutes    Untimed Charges Total Minutes 31  -GB       Total Minutes 31  -GB            User Key  (r) = Recorded By, (t) = Taken By, (c) = Cosigned By    Initials Name Provider Type    Analilia Bartholomew, PT Physical Therapist              Therapy Charges for Today     Code Description Service Date Service Provider Modifiers Qty    45395747447 HC PT EVAL MOD COMPLEXITY 2 3/5/2022 Analilia Naranjo, PT GP 1    23223783010 HC PT THER SUPP EA 15 MIN 3/5/2022 Analilia Naranjo, PT GP 1          PT G-Codes  Outcome Measure Options: AM-PAC 6 Clicks Basic Mobility (PT)  AM-PAC 6 Clicks Score (PT): 6  AM-PAC 6 Clicks Score (OT): 8    Analilia Naranjo PT  3/5/2022

## 2022-03-05 NOTE — PROGRESS NOTES
[facility]  INPATIENT PROGRESS NOTE    Length of Stay: 2  Date of Admission: 3/3/2022  Primary Care Physician: Provider, No Known    Subjective   Chief Complaint: Generalized weakness  HPI:    Reports having generalized weakness.  Refusing to get out of bed    Review of Systems   Respiratory: Negative for shortness of breath.    Cardiovascular: Negative for chest pain.        All pertinent negatives and positives are as above. All other systems have been reviewed and are negative unless otherwise stated.     Objective    Temp:  [97.3 °F (36.3 °C)-99.4 °F (37.4 °C)] 97.3 °F (36.3 °C)  Heart Rate:  [] 109  Resp:  [16-22] 16  BP: (111-144)/(61-85) 133/61  Physical Exam  Vitals and nursing note reviewed.   Constitutional:       General: He is not in acute distress.     Appearance: He is well-developed. He is not diaphoretic.   HENT:      Head: Normocephalic and atraumatic.   Cardiovascular:      Rate and Rhythm: Normal rate.   Pulmonary:      Effort: Pulmonary effort is normal. No respiratory distress.      Breath sounds: No wheezing.   Abdominal:      General: There is no distension.      Palpations: Abdomen is soft.   Musculoskeletal:         General: Normal range of motion.   Skin:     General: Skin is warm and dry.   Neurological:      Mental Status: He is alert.      Cranial Nerves: No cranial nerve deficit.   Psychiatric:         Behavior: Behavior normal.         Thought Content: Thought content normal.         Judgment: Judgment normal.             Results Review:  I have reviewed the labs, radiology results, and diagnostic studies.    Laboratory Data:   Lab Results (last 24 hours)     Procedure Component Value Units Date/Time    Blood Culture - Blood, Arm, Left [[id number]]  (Abnormal) Collected: 03/03/22 1419    Specimen: Blood from Arm, Left Updated: 03/05/22 0531     Blood Culture Staphylococcus aureus     Comment: Infectious disease  consultation is highly recommended to rule out distant foci of infection.        Isolated from Aerobic and Anaerobic Bottles     Gram Stain Aerobic Bottle Gram positive cocci in clusters     Comment: Bottle 3 of 4 drawn positive for gram positive cocci         Anaerobic Bottle Gram positive cocci in clusters     Comment: 3 bottles of 4 drawn positive for gram positive cocci in clusters       Blood Culture - Blood, Arm, Left [[id number]]  (Abnormal) Collected: 03/03/22 1323    Specimen: Blood from Arm, Left Updated: 03/05/22 0531     Blood Culture Staphylococcus aureus     Comment: Infectious disease consultation is highly recommended to rule out distant foci of infection.        Isolated from Aerobic and Anaerobic Bottles     Gram Stain Aerobic Bottle Gram positive cocci in clusters     Comment: Bottle 1 of 4 drawn positive for gram positive cocci         Anaerobic Bottle Gram positive cocci in clusters     Comment: Bottle 2 of 4 drawn positive for gram positive cocci             Culture Data:   Blood Culture   Date Value Ref Range Status   03/03/2022 Staphylococcus aureus (C)  Preliminary     Comment:     Infectious disease consultation is highly recommended to rule out distant foci of infection.   03/03/2022 Staphylococcus aureus (C)  Preliminary     Comment:     Infectious disease consultation is highly recommended to rule out distant foci of infection.     No results found for: URINECX  No results found for: RESPCX  No results found for: WOUNDCX  No results found for: STOOLCX  No components found for: BODYFLD    Radiology Data:   Imaging Results (Last 24 Hours)     Procedure Component Value Units Date/Time    MRI Brain Without Contrast [[id number]] Collected: 03/04/22 0925     Updated: 03/04/22 1230    Narrative:      MRI BRAIN WITHOUT IV CONTRAST    CLINICAL HISTORY:  76 years Male,Syncope, recurrent, M62.82  Rhabdomyolysis J18.9 Pneumonia, unspecified organism    COMPARISON: None    FINDINGS:  Axial, coronal,  and sagittal images were obtained  without IV contrast.    T2/FLAIR hyperintense signal in the bilateral  periventricular/deep cerebral white matter is most compatible  with mild to moderate chronic small vessel ischemic change. No  abnormal restricted diffusion to indicate acute infarct. No mass  effect. Ventricular size is normal. No evidence for hemosiderin  deposition. The appropriate flow voids are noted about the skull  base.    The paranasal sinuses and mastoid air cells are clear.      Impression:      1. Mild/moderate chronic small vessel ischemic change.  2. No acute infarct.  3. No mass effect.  4. No hydrocephalus.    Electronically signed by:  Ronny Lee MD  3/4/2022 12:27 PM CST  Workstation: [id number]          I have reviewed the patient's current medications.     Assessment/Plan     Active Hospital Problems    Diagnosis    • Non-traumatic rhabdomyolysis        Rhabdomyolysis-continue with IV hydration and continue monitoring CPK    Generalized weakness-PT OT ordered, MRI of brain negative for any acute changes    Bacteremia-IV antibiotics will be continued.  Continue with awaiting final cultures  MSSA  No source detected, will order echocardiogram    Carotid stenosis-will need outpatient follow-up with vascular surgery.    DVT prophylaxis-SCD    Patient is full code    I confirmed that the patient's Advance Care Plan is present, code status is documented, or surrogate decision maker is listed in the patient's medical record.         Kaveh Pineda MD   03/05/22   09:55 CST

## 2022-03-06 NOTE — THERAPY TREATMENT NOTE
Acute Care - Physical Therapy Treatment Note  [facility]     Patient Name: Tito Martin  : 1945  MRN: [id number]  Today's Date: 3/6/2022      Visit Dx:     ICD-10-CM ICD-9-CM   1. Non-traumatic rhabdomyolysis  M62.82 728.88   2. Pneumonia of right lung due to infectious organism, unspecified part of lung  J18.9 483.8   3. Impaired mobility and activities of daily living  Z74.09 V49.89    Z78.9    4. Impaired functional mobility, balance, gait, and endurance  Z74.09 V49.89     Patient Active Problem List   Diagnosis   • Non-traumatic rhabdomyolysis     Past Medical History:   Diagnosis Date   • Cancer (HCC)     skin   • Carpal tunnel syndrome    • GERD (gastroesophageal reflux disease)    • History of blood transfusion    • Rheumatoid arthritis (HCC)      Past Surgical History:   Procedure Laterality Date   • BACK SURGERY     • CARDIAC CATHETERIZATION     • CARPAL TUNNEL RELEASE     • EYE SURGERY     • OTHER SURGICAL HISTORY      lumbar disc fusion   • TRIGGER FINGER RELEASE       PT Assessment (last 12 hours)     PT Evaluation and Treatment     Row Name 22 1250          Physical Therapy Time and Intention    Subjective Information complains of;pain  -LATONYA     Mode of Treatment physical therapy;individual therapy  -LATONYA     Patient Effort adequate  -LATONYA     Comment pt states he cannot get up right now due to pain. pt agreeable to LE ther ex.  -LATONYA     Row Name 22 1250          General Information    Patient Profile Reviewed yes  -LATONYA     Existing Precautions/Restrictions fall;spinal  -LATONYA     Row Name 22 1250          Pain    Pretreatment Pain Rating 7/10  -LATONYA     Posttreatment Pain Rating 7/10  -LATONYA     Pain Location - --  middle back  -LATONYA     Pain Intervention(s) Repositioned  -LATONYA     Row Name 22 1250          Cognition    Orientation Status (Cognition) oriented x 4  -LATONYA     Personal Safety Interventions fall prevention program maintained;gait belt;muscle strengthening  facilitated;nonskid shoes/slippers when out of bed  -LATONYA     Row Name 03/06/22 1250          Motor Skills    Therapeutic Exercise --  AP, hipAb/Ad, HS, SAQ, QS, GS, - 5-10x1 emir A-AAROM  -LATONYA     Row Name 03/06/22 1250          Vital Signs    Pre Systolic BP Rehab 174  -LATONYA     Pre Treatment Diastolic BP 78  -LATONYA     Post Systolic BP Rehab 140  -LATONYA     Post Treatment Diastolic BP 86  -LATONYA     Pretreatment Heart Rate (beats/min) 101  -LATONYA     Posttreatment Heart Rate (beats/min) 102  -LATONYA     Pre SpO2 (%) 95  -LATONYA     O2 Delivery Pre Treatment room air  -LATONYA     Post SpO2 (%) 95  -LATONYA     O2 Delivery Post Treatment room air  -LATONYA     Pre Patient Position Supine  -LATONYA     Post Patient Position Supine  -LATONYA     Row Name 03/06/22 1250          Positioning and Restraints    Pre-Treatment Position in bed  -LATONYA     Post Treatment Position bed  -LATONYA     In Bed fowlers;call light within reach;exit alarm on;encouraged to call for assist  -LATONYA           User Key  (r) = Recorded By, (t) = Taken By, (c) = Cosigned By    Initials Name Provider Type    Cristopher Barriga, PTA Physical Therapy Assistant                Physical Therapy Education                 Title: PT OT SLP Therapies (In Progress)     Topic: Physical Therapy (In Progress)     Point: Mobility training (In Progress)     Learning Progress Summary           Patient Acceptance, E, NR by LATONYA at 3/6/2022 1528    Comment: pt edu on benefits of mobilty and OOB    Acceptance, E, NR,DU,VU by DEREK at 3/5/2022 1640    Comment: POC: pt informed of risks of blood clots, pressure ulcers, PNE and loss of strength at possibly 1.5% if not getitng up/moving; he verbalized understanding but stated he felt he needed a little healing on own before moving for now   Family Acceptance, E, NR,DU,VU by DEREK at 3/5/2022 1640    Comment: POC: pt informed of risks of blood clots, pressure ulcers, PNE and loss of strength at possibly 1.5% if not getitng up/moving; he verbalized understanding but stated he felt he  needed a little healing on own before moving for now                   Point: Home exercise program (Done)     Learning Progress Summary           Patient Acceptance, E, NR,DU,VU by DEREK at 3/5/2022 1640    Comment: POC: pt informed of risks of blood clots, pressure ulcers, PNE and loss of strength at possibly 1.5% if not getitng up/moving; he verbalized understanding but stated he felt he needed a little healing on own before moving for now   Family Acceptance, E, NR,DU,VU by DEREK at 3/5/2022 1640    Comment: POC: pt informed of risks of blood clots, pressure ulcers, PNE and loss of strength at possibly 1.5% if not getitng up/moving; he verbalized understanding but stated he felt he needed a little healing on own before moving for now                   Point: Body mechanics (Done)     Learning Progress Summary           Patient Acceptance, E, NR,DU,VU by DEREK at 3/5/2022 1640    Comment: POC: pt informed of risks of blood clots, pressure ulcers, PNE and loss of strength at possibly 1.5% if not getitng up/moving; he verbalized understanding but stated he felt he needed a little healing on own before moving for now   Family Acceptance, E, NR,DU,VU by DEREK at 3/5/2022 1640    Comment: POC: pt informed of risks of blood clots, pressure ulcers, PNE and loss of strength at possibly 1.5% if not getitng up/moving; he verbalized understanding but stated he felt he needed a little healing on own before moving for now                   Point: Precautions (Done)     Learning Progress Summary           Patient Acceptance, E, NR,DU,VU by DEREK at 3/5/2022 1640    Comment: POC: pt informed of risks of blood clots, pressure ulcers, PNE and loss of strength at possibly 1.5% if not getitng up/moving; he verbalized understanding but stated he felt he needed a little healing on own before moving for now   Family Acceptance, E, NR,DU,VU by DEREK at 3/5/2022 1640    Comment: POC: pt informed of risks of blood clots, pressure ulcers, PNE and loss of  strength at possibly 1.5% if not getitng up/moving; he verbalized understanding but stated he felt he needed a little healing on own before moving for now                               User Key     Initials Effective Dates Name Provider Type Discipline     06/16/21 -  Analilia Naranjo, PT Physical Therapist PT    LATONYA 06/16/21 -  Cristopher Saucedo, PTA Physical Therapy Assistant PT              PT Recommendation and Plan     Plan of Care Reviewed With: patient  Progress: improving  Outcome Evaluation: pt agreeable to PT but states he is in too much pain for anything other than exercises in bed. LE ther ex performed AROM/AAROM. no new goals met at this time. pt edu on the benefits of mobility and OOB. no new goals met at this time. pt would continue to benefit from PT services.   Outcome Measures     Row Name 03/06/22 1250 03/05/22 1540          How much help from another person do you currently need...    Turning from your back to your side while in flat bed without using bedrails? 1  -LATONYA --     Moving from lying on back to sitting on the side of a flat bed without bedrails? 1  -LATONYA --     Moving to and from a bed to a chair (including a wheelchair)? 1  -LATONYA --     Standing up from a chair using your arms (e.g., wheelchair, bedside chair)? 1  -LATONYA --     Climbing 3-5 steps with a railing? 1  -LATONYA --     To walk in hospital room? 1  -LATONYA --     AM-PAC 6 Clicks Score (PT) 6  -LATONYA --            How much help from another is currently needed...    Putting on and taking off regular lower body clothing? -- 1  -RB     Bathing (including washing, rinsing, and drying) -- 1  -RB     Toileting (which includes using toilet bed pan or urinal) -- 1  -RB     Putting on and taking off regular upper body clothing -- 1  -RB     Taking care of personal grooming (such as brushing teeth) -- 2  -RB     Eating meals -- 2  -RB     AM-PAC 6 Clicks Score (OT) -- 8  -RB            Functional Assessment    Outcome Measure Options AM-PAC 6  Clicks Basic Mobility (PT)  -LATONYA AM-PAC 6 Clicks Daily Activity (OT)  -RB           User Key  (r) = Recorded By, (t) = Taken By, (c) = Cosigned By    Initials Name Provider Type    RB Fran Jain, OT Occupational Therapist    Cristopher Barriga PTA Physical Therapy Assistant                 Time Calculation:    PT Charges     Row Name 03/06/22 1538             Time Calculation    Start Time 1250  -LATONYA      Stop Time 1317  -LATONYA      Time Calculation (min) 27 min  -LATONYA              Time Calculation- PT    Total Timed Code Minutes- PT 27 minute(s)  -LATONYA              Timed Charges    50302 - PT Therapeutic Exercise Minutes 27  -LATONYA              Total Minutes    Timed Charges Total Minutes 27  -LATONYA       Total Minutes 27  -LATONYA            User Key  (r) = Recorded By, (t) = Taken By, (c) = Cosigned By    Initials Name Provider Type    Cristopher Barriga PTA Physical Therapy Assistant              Therapy Charges for Today     Code Description Service Date Service Provider Modifiers Qty    68446967504 HC PT THER PROC EA 15 MIN 3/6/2022 Cristopher Saucedo PTA GP 2          PT G-Codes  Outcome Measure Options: AM-PAC 6 Clicks Basic Mobility (PT)  AM-PAC 6 Clicks Score (PT): 6  AM-PAC 6 Clicks Score (OT): 8    Cristopher Saucedo PTA  3/6/2022

## 2022-03-06 NOTE — PLAN OF CARE
Goal Outcome Evaluation:  Plan of Care Reviewed With: patient        Progress: improving  Outcome Evaluation: pt agreeable to PT but states he is in too much pain for anything other than exercises in bed. LE ther ex performed AROM/AAROM. no new goals met at this time. pt edu on the benefits of mobility and OOB. no new goals met at this time. pt would continue to benefit from PT services.

## 2022-03-06 NOTE — PLAN OF CARE
Goal Outcome Evaluation:              Outcome Evaluation: VSS this shift. pain controlled with PRN medication per order. pt resting at this time with no complaints

## 2022-03-06 NOTE — PROGRESS NOTES
[facility] Medicine Services  INPATIENT PROGRESS NOTE    Length of Stay: 3  Date of Admission: 3/3/2022  Primary Care Physician: Provider, No Known    Subjective   Chief Complaint: Generalized weakness  HPI:    Patient resting in bed.  Doing stable.  Continue with his antibiotics.    Review of Systems   Respiratory: Negative for shortness of breath.    Cardiovascular: Negative for chest pain.        All pertinent negatives and positives are as above. All other systems have been reviewed and are negative unless otherwise stated.     Objective    Temp:  [96.9 °F (36.1 °C)-99.4 °F (37.4 °C)] 96.9 °F (36.1 °C)  Heart Rate:  [] 100  Resp:  [16-18] 16  BP: (107-137)/(56-86) 132/75  Physical Exam  Vitals and nursing note reviewed.   Constitutional:       General: He is not in acute distress.     Appearance: He is well-developed. He is not diaphoretic.   HENT:      Head: Normocephalic and atraumatic.   Cardiovascular:      Rate and Rhythm: Normal rate.   Pulmonary:      Effort: Pulmonary effort is normal. No respiratory distress.      Breath sounds: No wheezing.   Abdominal:      General: There is no distension.      Palpations: Abdomen is soft.   Musculoskeletal:         General: Normal range of motion.   Skin:     General: Skin is warm and dry.   Neurological:      Mental Status: He is alert.      Cranial Nerves: No cranial nerve deficit.   Psychiatric:         Behavior: Behavior normal.         Thought Content: Thought content normal.         Judgment: Judgment normal.             Results Review:  I have reviewed the labs, radiology results, and diagnostic studies.    Laboratory Data:   Lab Results (last 24 hours)     Procedure Component Value Units Date/Time    Comprehensive Metabolic Panel [[id number]]  (Abnormal) Collected: 03/06/22 0545    Specimen: Blood Updated: 03/06/22 0630     Glucose 126 mg/dL      BUN 18 mg/dL      Creatinine 0.77 mg/dL      Sodium 132 mmol/L       Potassium 3.7 mmol/L      Chloride 97 mmol/L      CO2 26.0 mmol/L      Calcium 8.2 mg/dL      Total Protein 5.8 g/dL      Albumin 2.00 g/dL      ALT (SGPT) 17 U/L      AST (SGOT) 44 U/L      Alkaline Phosphatase 76 U/L      Total Bilirubin 0.6 mg/dL      Globulin 3.8 gm/dL      A/G Ratio 0.5 g/dL      BUN/Creatinine Ratio 23.4     Anion Gap 9.0 mmol/L      eGFR 92.2 mL/min/1.73      Comment: National Kidney Foundation and American Society of Nephrology (ASN) Task Force recommended calculation based on the Chronic Kidney Disease Epidemiology Collaboration (CKD-EPI) equation refit without adjustment for race.       Narrative:      GFR Normal >60  Chronic Kidney Disease <60  Kidney Failure <15      CBC & Differential [[id number]]  (Abnormal) Collected: 03/06/22 0545    Specimen: Blood Updated: 03/06/22 0626    Narrative:      The following orders were created for panel order CBC & Differential.  Procedure                               Abnormality         Status                     ---------                               -----------         ------                     CBC Auto Differential[[id number]]        Abnormal            Final result               Scan Slide[[id number]]                                                                    Please view results for these tests on the individual orders.    CBC Auto Differential [[id number]]  (Abnormal) Collected: 03/06/22 0545    Specimen: Blood Updated: 03/06/22 0621     WBC 14.27 10*3/mm3      RBC 4.22 10*6/mm3      Hemoglobin 12.8 g/dL      Hematocrit 36.6 %      MCV 86.7 fL      MCH 30.3 pg      MCHC 35.0 g/dL      RDW 13.4 %      RDW-SD 42.5 fl      MPV 11.8 fL      Platelets 118 10*3/mm3      Neutrophil % 89.4 %      Lymphocyte % 3.2 %      Monocyte % 5.7 %      Eosinophil % 0.0 %      Basophil % 0.6 %      Immature Grans % 1.1 %      Neutrophils, Absolute 12.77 10*3/mm3      Lymphocytes, Absolute 0.46 10*3/mm3      Monocytes, Absolute 0.81 10*3/mm3      Eosinophils,  Absolute 0.00 10*3/mm3      Basophils, Absolute 0.08 10*3/mm3      Immature Grans, Absolute 0.15 10*3/mm3      nRBC 0.0 /100 WBC     Blood Culture - Blood, Arm, Left [[id number]]  (Abnormal) Collected: 03/03/22 1419    Specimen: Blood from Arm, Left Updated: 03/06/22 0427     Blood Culture Staphylococcus aureus     Comment: Infectious disease consultation is highly recommended to rule out distant foci of infection.        Isolated from Aerobic and Anaerobic Bottles     Gram Stain Aerobic Bottle Gram positive cocci in clusters     Comment: Bottle 3 of 4 drawn positive for gram positive cocci         Anaerobic Bottle Gram positive cocci in clusters     Comment: 3 bottles of 4 drawn positive for gram positive cocci in clusters       Narrative:      Refer to previous blood culture collected on 3/3/2022 1423 for MICs      Blood Culture - Blood, Arm, Left [[id number]]  (Abnormal)  (Susceptibility) Collected: 03/03/22 1323    Specimen: Blood from Arm, Left Updated: 03/06/22 0427     Blood Culture Staphylococcus aureus     Comment: Infectious disease consultation is highly recommended to rule out distant foci of infection.        Isolated from Aerobic and Anaerobic Bottles     Gram Stain Aerobic Bottle Gram positive cocci in clusters     Comment: Bottle 1 of 4 drawn positive for gram positive cocci         Anaerobic Bottle Gram positive cocci in clusters     Comment: Bottle 2 of 4 drawn positive for gram positive cocci       Susceptibility      Staphylococcus aureus      YONG      Gentamicin Susceptible      Oxacillin Susceptible      Rifampin Susceptible      Vancomycin Susceptible                    Linear View               Susceptibility Comments     Staphylococcus aureus    This isolate does not demonstrate inducible clindamycin resistance in vitro.    This isolate does not demonstrate inducible clindamycin resistance in vitro.                     Culture Data:   Blood Culture   Date Value Ref Range Status   03/03/2022  Staphylococcus aureus (C)  Final     Comment:     Infectious disease consultation is highly recommended to rule out distant foci of infection.   03/03/2022 Staphylococcus aureus (C)  Final     Comment:     Infectious disease consultation is highly recommended to rule out distant foci of infection.     No results found for: URINECX  No results found for: RESPCX  No results found for: WOUNDCX  No results found for: STOOLCX  No components found for: BODYFLD    Radiology Data:   Imaging Results (Last 24 Hours)     ** No results found for the last 24 hours. **          I have reviewed the patient's current medications.     Assessment/Plan     Active Hospital Problems    Diagnosis    • Non-traumatic rhabdomyolysis        Rhabdomyolysis-continue with IV hydration and continue monitoring CPK  Repeat CPK today.    Generalized weakness-PT OT ordered, MRI of brain negative for any acute changes    Bacteremia-IV antibiotics will be continued.  Continue with awaiting final cultures  MSSA  No source detected, will order echocardiogram  CT of the back was negative for any signs of infection.  CT of the chest and abdomen ordered yesterday to look for source of infection, but patient declined  WBC count higher today.  We will continue with antibiotic.    Carotid stenosis-will need outpatient follow-up with vascular surgery.    DVT prophylaxis-SCD    Patient is full code    I confirmed that the patient's Advance Care Plan is present, code status is documented, or surrogate decision maker is listed in the patient's medical record.         Kaveh Pineda MD   03/06/22   10:07 CST

## 2022-03-07 NOTE — NURSING NOTE
Patient has stage II wound to left elbow that measures 2 x 2.5 cm. Blister intact with fluid underneath.  Patient has stage II wound to right elbow that measures 1 x 0.8 cm.  Fluid filled blister intact.  Bilateral elbow wounds were NPOA.  Patient has scattered scabs to BLE and scab to left hand fourth finger.  Patient needs wound care, offloading and protection.

## 2022-03-07 NOTE — THERAPY TREATMENT NOTE
Patient Name: Tito Martin  : 1945    MRN: [id number]                              Today's Date: 3/7/2022       Admit Date: 3/3/2022    Visit Dx:     ICD-10-CM ICD-9-CM   1. Non-traumatic rhabdomyolysis  M62.82 728.88   2. Pneumonia of right lung due to infectious organism, unspecified part of lung  J18.9 483.8   3. Impaired mobility and activities of daily living  Z74.09 V49.89    Z78.9    4. Impaired functional mobility, balance, gait, and endurance  Z74.09 V49.89     Patient Active Problem List   Diagnosis   • Non-traumatic rhabdomyolysis     Past Medical History:   Diagnosis Date   • Cancer (HCC)     skin   • Carpal tunnel syndrome    • GERD (gastroesophageal reflux disease)    • History of blood transfusion    • Rheumatoid arthritis (HCC)      Past Surgical History:   Procedure Laterality Date   • BACK SURGERY     • CARDIAC CATHETERIZATION     • CARPAL TUNNEL RELEASE     • EYE SURGERY     • OTHER SURGICAL HISTORY      lumbar disc fusion   • TRIGGER FINGER RELEASE        General Information     Row Name 22 0753          OT Time and Intention    Document Type therapy note (daily note)  -KD     Mode of Treatment occupational therapy;individual therapy  -KD     Row Name 22 0753          General Information    Patient Profile Reviewed yes  -KD     Existing Precautions/Restrictions fall;spinal  -KD     Row Name 22 0753          Cognition    Orientation Status (Cognition) oriented x 4  -KD           User Key  (r) = Recorded By, (t) = Taken By, (c) = Cosigned By    Initials Name Provider Type    KD Josette Cerna COTA Occupational Therapy Assistant                 Mobility/ADL's     Row Name 22 0753          Activities of Daily Living    BADL Assessment/Intervention feeding;grooming  -KD     Row Name 22 0753          Self-Feeding Assessment/Training    [place] Level (Feeding) feeding skills;finger foods;liquids to mouth;prepare tray/open items;scoop food and  bring to mouth;dependent (less than 25% patient effort)  -KD     Row Name 03/07/22 [facility]          Grooming Assessment/Training    [place] Level (Grooming) grooming skills;wash face, hands;oral care regimen;dependent (less than 25% patient effort)  -KD     Position (Grooming) long sitting  -KD           User Key  (r) = Recorded By, (t) = Taken By, (c) = Cosigned By    Initials Name Provider Type    Josette Thompson COTA Occupational Therapy Assistant               Obj/Interventions    No documentation.                Goals/Plan     Row Name 03/07/22 [facility]          Transfer Goal 1 (OT)    Activity/Assistive Device (Transfer Goal 1, OT) transfers, all  -KD     [place] Level/Cues Needed (Transfer Goal 1, OT) contact guard required  -KD     Time Frame (Transfer Goal 1, OT) long term goal (LTG)  -KD     Progress/Outcome (Transfer Goal 1, OT) goal not met  -KD     Row Name 03/07/22 [facility]          Bathing Goal 1 (OT)    Activity/Device (Bathing Goal 1, OT) upper body bathing  -KD     [place] Level/Cues Needed (Bathing Goal 1, OT) contact guard required  -KD     Time Frame (Bathing Goal 1, OT) long term goal (LTG)  -KD     Progress/Outcomes (Bathing Goal 1, OT) goal not met  -KD     Row Name 03/07/22 [facility]          Dressing Goal 1 (OT)    Activity/Device (Dressing Goal 1, OT) upper body dressing  -KD     [place]/Cues Needed (Dressing Goal 1, OT) contact guard required  -KD     Time Frame (Dressing Goal 1, OT) long term goal (LTG)  -KD     Progress/Outcome (Dressing Goal 1, OT) goal not met  -KD     Row Name 03/07/22 [facility]          Grooming Goal 1 (OT)    Activity/Device (Grooming Goal 1, OT) hair care;oral care  -KD     [place] (Grooming Goal 1, OT) supervision required  -KD     Time Frame (Grooming Goal 1, OT) long term goal (LTG)  -KD     Progress/Outcome (Grooming Goal 1, OT) goal not met  -KD     Row Name 03/07/22 [facility]          Self-Feeding Goal 1 (OT)    Activity/Device (Self-Feeding Goal 1, OT)  self-feeding skills, all  -KD     [place] Level/Cues Needed (Self-Feeding Goal 1, OT) supervision required  -KD     Time Frame (Self-Feeding Goal 1, OT) long term goal (LTG)  -KD     Progress/Outcomes (Self-Feeding Goal 1, OT) goal not met  -KD           User Key  (r) = Recorded By, (t) = Taken By, (c) = Cosigned By    Initials Name Provider Type     Josette Cerna COTA Occupational Therapy Assistant               Clinical Impression     Row Name 03/07/22 0753          Pain Assessment    Pretreatment Pain Rating 7/10  -KD     Posttreatment Pain Rating 7/10  -KD     Pain Location - back  -KD     Row Name 03/07/22 0753          Plan of Care Review    Plan of Care Reviewed With patient  -KD     Progress no change  -KD     Outcome Evaluation Pt w/ breakfast tray upon arrival. Pt dep for feeding and tray set up. Pt also dep for Oral Care while longsitting in bed. ont OT POC.  -KD     Row Name 03/07/22 0753          Therapy Assessment/Plan (OT)    Patient/Family Therapy Goal Statement (OT) --  -KD     Therapy Frequency (OT) --  3-7 days a week  -KD     Row Name 03/07/22 0753          Therapy Plan Review/Discharge Plan (OT)    Anticipated Discharge Disposition (OT) inpatient rehabilitation facility;[facility] ([facility])  -KD     Row Name 03/07/22 0753          Vital Signs    Pre Systolic BP Rehab 136  -KD     Pre Treatment Diastolic BP 80  -KD     Pretreatment Heart Rate (beats/min) 95  -KD     Pre SpO2 (%) 95  -KD     O2 Delivery Pre Treatment room air  -KD     Pre Patient Position Supine  -KD     Intra Patient Position Sitting  -KD     Post Patient Position Supine  -KD     Row Name 03/07/22 0753          Positioning and Restraints    Pre-Treatment Position in bed  -KD     Post Treatment Position bed  -KD     In Bed fowlers;call light within reach;encouraged to call for assist;exit alarm on  -KD           User Key  (r) = Recorded By, (t) = Taken By, (c) = Cosigned By    Initials Name Provider Type      Josette Cerna COTA Occupational Therapy Assistant               Outcome Measures     Row Name 03/07/22 0753          How much help from another is currently needed...    Putting on and taking off regular lower body clothing? 1  -KD     Bathing (including washing, rinsing, and drying) 1  -KD     Toileting (which includes using toilet bed pan or urinal) 1  -KD     Putting on and taking off regular upper body clothing 1  -KD     Taking care of personal grooming (such as brushing teeth) 2  -KD     Eating meals 2  -KD     AM-PAC 6 Clicks Score (OT) 8  -KD           User Key  (r) = Recorded By, (t) = Taken By, (c) = Cosigned By    Initials Name Provider Type    KD Josette Cerna COTA Occupational Therapy Assistant                Occupational Therapy Education                 Title: PT OT SLP Therapies (In Progress)     Topic: Occupational Therapy (In Progress)     Point: ADL training (Done)     Description:   Instruct learner(s) on proper safety adaptation and remediation techniques during self care or transfers.   Instruct in proper use of assistive devices.              Learning Progress Summary           Patient Acceptance, E, VU,NR by RB at 3/5/2022 1636    Comment: Edu pt on grooming skills and purpose of occupational therapy.                   Point: Home exercise program (Not Started)     Description:   Instruct learner(s) on appropriate technique for monitoring, assisting and/or progressing therapeutic exercises/activities.              Learner Progress:  Not documented in this visit.          Point: Precautions (Not Started)     Description:   Instruct learner(s) on prescribed precautions during self-care and functional transfers.              Learner Progress:  Not documented in this visit.          Point: Body mechanics (Not Started)     Description:   Instruct learner(s) on proper positioning and spine alignment during self-care, functional mobility activities and/or exercises.              Learner  Progress:  Not documented in this visit.                      User Key     Initials Effective Dates Name Provider Type Discipline    RB 06/16/21 -  Fran Jain, OT Occupational Therapist OT              OT Recommendation and Plan  Therapy Frequency (OT):  (3-7 days a week)  Plan of Care Review  Plan of Care Reviewed With: patient  Progress: no change  Outcome Evaluation: Pt w/ breakfast tray upon arrival. Pt dep for feeding and tray set up. Pt also dep for Oral Care while longsitting in bed. ont OT POC.     Time Calculation:    Time Calculation- OT     Row Name 03/07/22 0753             Time Calculation- OT    OT Start Time 0753  -KD      OT Stop Time 0817  -KD      OT Time Calculation (min) 24 min  -KD      Total Timed Code Minutes- OT 24 minute(s)  -KD      OT Received On 03/07/22  -KD              Timed Charges    41352 - OT Self Care/Mgmt Minutes 24  -KD              Total Minutes    Timed Charges Total Minutes 24  -KD       Total Minutes 24  -KD            User Key  (r) = Recorded By, (t) = Taken By, (c) = Cosigned By    Initials Name Provider Type    KD Josette Cerna COTA Occupational Therapy Assistant              Therapy Charges for Today     Code Description Service Date Service Provider Modifiers Qty    75459292748 HC OT SELF CARE/MGMT/TRAIN EA 15 MIN 3/7/2022 Josette Cerna COTA GO 2               SHIMON Mercer  3/7/2022

## 2022-03-07 NOTE — THERAPY TREATMENT NOTE
Acute Care - Physical Therapy Treatment Note  [facility]     Patient Name: Tito Martin  : 1945  MRN: [id number]  Today's Date: 3/7/2022      Visit Dx:     ICD-10-CM ICD-9-CM   1. Non-traumatic rhabdomyolysis  M62.82 728.88   2. Pneumonia of right lung due to infectious organism, unspecified part of lung  J18.9 483.8   3. Impaired mobility and activities of daily living  Z74.09 V49.89    Z78.9    4. Impaired functional mobility, balance, gait, and endurance  Z74.09 V49.89     Patient Active Problem List   Diagnosis   • Non-traumatic rhabdomyolysis     Past Medical History:   Diagnosis Date   • Cancer (HCC)     skin   • Carpal tunnel syndrome    • GERD (gastroesophageal reflux disease)    • History of blood transfusion    • Rheumatoid arthritis (HCC)      Past Surgical History:   Procedure Laterality Date   • BACK SURGERY     • CARDIAC CATHETERIZATION     • CARPAL TUNNEL RELEASE     • EYE SURGERY     • OTHER SURGICAL HISTORY      lumbar disc fusion   • TRIGGER FINGER RELEASE       PT Assessment (last 12 hours)     PT Evaluation and Treatment     Row Name 22 1230          Physical Therapy Time and Intention    Document Type therapy note (daily note)  -RW     Mode of Treatment physical therapy;individual therapy  -RW     Patient Effort adequate  -RW     Row Name 22 1230          General Information    Patient Profile Reviewed yes  -RW     Existing Precautions/Restrictions fall;spinal  -RW     Row Name 22 1230          Pain    Pretreatment Pain Rating 10  -RW     Posttreatment Pain Rating 10  -RW     Row Name 22 1230          Cognition    Orientation Status (Cognition) oriented x 4  -RW     Row Name 22 1230          Bed Mobility    Comment, (Bed Mobility) none  -RW     Row Name 22 1230          Transfers    [place] (Transfers) not tested  -RW     Row Name 22 1230          Motor Skills    Therapeutic Exercise hip;knee;ankle  -RW     Row  Name 03/07/22 1230          Hip (Therapeutic Exercise)    Hip (Therapeutic Exercise) AAROM (active assistive range of motion)  -RW     Hip AAROM (Therapeutic Exercise) flexion;extension;aBduction;aDduction;supine;10 repetitions;5 repetitions  -RW     Row Name 03/07/22 1230          Knee (Therapeutic Exercise)    Knee (Therapeutic Exercise) isometric exercises  -RW     Knee Isometrics (Therapeutic Exercise) quad sets;10 repetitions  -RW     Row Name 03/07/22 1230          Ankle (Therapeutic Exercise)    Ankle (Therapeutic Exercise) AROM (active range of motion)  -RW     Ankle AROM (Therapeutic Exercise) dorsiflexion;plantarflexion;15 repititions  -RW     Row Name             Wound 03/07/22 0700 Left posterior  Other (comment)    Wound - Properties Group Placement Date: 03/07/22  -MM Placement Time: 0700  -MM Side: Left  -MM Orientation: posterior  -MM Location: --  -MM, fourth nuckle  Primary Wound Type: Other  -MM, scab      Retired Wound - Properties Group Placement Date: 03/07/22  -MM Placement Time: 0700  -MM Side: Left  -MM Orientation: posterior  -MM Location: --  -MM, fourth nuckle  Primary Wound Type: Other  -MM, scab      Retired Wound - Properties Group Date first assessed: 03/07/22  -MM Time first assessed: 0700  -MM Side: Left  -MM Location: --  -MM, fourth nuckle  Primary Wound Type: Other  -MM, scab      Row Name             Wound 03/07/22 0701 Left lower leg Other (comment)    Wound - Properties Group Placement Date: 03/07/22  -MM Placement Time: 0701  -MM Present on Hospital Admission: Y  -MM Side: Left  -MM Orientation: lower  -MM Location: leg  -MM Primary Wound Type: Other  -MM, scab      Retired Wound - Properties Group Placement Date: 03/07/22  -MM Placement Time: 0701  -MM Present on Hospital Admission: Y  -MM Side: Left  -MM Orientation: lower  -MM Location: leg  -MM Primary Wound Type: Other  -MM, scab      Retired Wound - Properties Group Date first assessed: 03/07/22  -MM Time first  assessed: 0701  -MM Present on Hospital Admission: Y  -MM Side: Left  -MM Location: leg  -MM Primary Wound Type: Other  -MM, scab      Row Name             Wound 03/07/22 0700 Left anterior ankle Other (comment)    Wound - Properties Group Placement Date: 03/07/22  -MM Placement Time: 0700  -MM Present on Hospital Admission: Y  -MM Side: Left  -MM Orientation: anterior  -MM Location: ankle  -MM Primary Wound Type: Other  -MM, scab      Retired Wound - Properties Group Placement Date: 03/07/22  -MM Placement Time: 0700  -MM Present on Hospital Admission: Y  -MM Side: Left  -MM Orientation: anterior  -MM Location: ankle  -MM Primary Wound Type: Other  -MM, scab      Retired Wound - Properties Group Date first assessed: 03/07/22  -MM Time first assessed: 0700  -MM Present on Hospital Admission: Y  -MM Side: Left  -MM Location: ankle  -MM Primary Wound Type: Other  -MM, scab      Row Name             Wound 03/07/22 0700 Left posterior elbow Other (comment)    Wound - Properties Group Placement Date: 03/07/22  -MM Placement Time: 0700  -MM Present on Hospital Admission: N  -MM Side: Left  -MM Orientation: posterior  -MM Location: elbow  -MM Primary Wound Type: Other  -MM     Retired Wound - Properties Group Placement Date: 03/07/22  -MM Placement Time: 0700  -MM Present on Hospital Admission: N  -MM Side: Left  -MM Orientation: posterior  -MM Location: elbow  -MM Primary Wound Type: Other  -MM     Retired Wound - Properties Group Date first assessed: 03/07/22  -MM Time first assessed: 0700  -MM Present on Hospital Admission: N  -MM Side: Left  -MM Location: elbow  -MM Primary Wound Type: Other  -MM     Row Name             Wound 03/07/22 0700 Right posterior elbow    Wound - Properties Group Placement Date: 03/07/22  -MM Placement Time: 0700  -MM Present on Hospital Admission: N  -MM Side: Right  -MM Orientation: posterior  -MM Location: elbow  -MM     Retired Wound - Properties Group Placement Date: 03/07/22  -MM  Placement Time: 0700  -MM Present on Hospital Admission: N  -MM Side: Right  -MM Orientation: posterior  -MM Location: elbow  -MM     Retired Wound - Properties Group Date first assessed: 03/07/22  -MM Time first assessed: 0700  -MM Present on Hospital Admission: N  -MM Side: Right  -MM Location: elbow  -MM     Row Name 03/07/22 1230          Positioning and Restraints    Pre-Treatment Position in bed  -RW     Post Treatment Position bed  -RW     In Bed with nsg  -RW           User Key  (r) = Recorded By, (t) = Taken By, (c) = Cosigned By    Initials Name Provider Type    MM Sherry Emanuel, RN Registered Nurse    Cristofer Ron, PTA Physical Therapy Assistant                Physical Therapy Education                 Title: PT OT SLP Therapies (In Progress)     Topic: Physical Therapy (In Progress)     Point: Mobility training (In Progress)     Learning Progress Summary           Patient Acceptance, E, NR by LATONYA at 3/6/2022 1528    Comment: pt edu on benefits of mobilty and OOB    Acceptance, E, NR,DU,VU by DEREK at 3/5/2022 1640    Comment: POC: pt informed of risks of blood clots, pressure ulcers, PNE and loss of strength at possibly 1.5% if not getitng up/moving; he verbalized understanding but stated he felt he needed a little healing on own before moving for now   Family Acceptance, E, NR,DU,VU by DEREK at 3/5/2022 1640    Comment: POC: pt informed of risks of blood clots, pressure ulcers, PNE and loss of strength at possibly 1.5% if not getitng up/moving; he verbalized understanding but stated he felt he needed a little healing on own before moving for now                   Point: Home exercise program (Done)     Learning Progress Summary           Patient Acceptance, E, NR,DU,VU by DEREK at 3/5/2022 1640    Comment: POC: pt informed of risks of blood clots, pressure ulcers, PNE and loss of strength at possibly 1.5% if not getitng up/moving; he verbalized understanding but stated he felt he needed a little healing  on own before moving for now   Family Acceptance, E, NR,DU,VU by DEREK at 3/5/2022 1640    Comment: POC: pt informed of risks of blood clots, pressure ulcers, PNE and loss of strength at possibly 1.5% if not getitng up/moving; he verbalized understanding but stated he felt he needed a little healing on own before moving for now                   Point: Body mechanics (Done)     Learning Progress Summary           Patient Acceptance, E, NR,DU,VU by DEREK at 3/5/2022 1640    Comment: POC: pt informed of risks of blood clots, pressure ulcers, PNE and loss of strength at possibly 1.5% if not getitng up/moving; he verbalized understanding but stated he felt he needed a little healing on own before moving for now   Family Acceptance, E, NR,DU,VU by DEREK at 3/5/2022 1640    Comment: POC: pt informed of risks of blood clots, pressure ulcers, PNE and loss of strength at possibly 1.5% if not getitng up/moving; he verbalized understanding but stated he felt he needed a little healing on own before moving for now                   Point: Precautions (Done)     Learning Progress Summary           Patient Acceptance, E, NR,DU,VU by DEREK at 3/5/2022 1640    Comment: POC: pt informed of risks of blood clots, pressure ulcers, PNE and loss of strength at possibly 1.5% if not getitng up/moving; he verbalized understanding but stated he felt he needed a little healing on own before moving for now   Family Acceptance, E, NR,DU,VU by  at 3/5/2022 1640    Comment: POC: pt informed of risks of blood clots, pressure ulcers, PNE and loss of strength at possibly 1.5% if not getitng up/moving; he verbalized understanding but stated he felt he needed a little healing on own before moving for now                               User Key     Initials Effective Dates Name Provider Type Discipline    DEREK 06/16/21 -  Analilia Naranjo, PT Physical Therapist PT    LATONYA 06/16/21 -  Cristopher Saucedo, PTA Physical Therapy Assistant PT              PT  Recommendation and Plan     Plan of Care Reviewed With: patient  Progress: no change  Outcome Evaluation: 2nd attempt today. pt with family and nurse in room. agrees to some therex, no mobility attempted this visit. tolerated some easy therex with some mild resistive hip extension. agrees to ordered scans and now nurse back to in to cath pt. attempt to mobilize as able.   Outcome Measures     Row Name 03/06/22 1250 03/05/22 1540          How much help from another person do you currently need...    Turning from your back to your side while in flat bed without using bedrails? 1  -LATONYA --     Moving from lying on back to sitting on the side of a flat bed without bedrails? 1  -LATNOYA --     Moving to and from a bed to a chair (including a wheelchair)? 1  -LATONYA --     Standing up from a chair using your arms (e.g., wheelchair, bedside chair)? 1  -LATONYA --     Climbing 3-5 steps with a railing? 1  -LATONYA --     To walk in hospital room? 1  -LATONYA --     AM-PAC 6 Clicks Score (PT) 6  -LATONYA --            How much help from another is currently needed...    Putting on and taking off regular lower body clothing? -- 1  -RB     Bathing (including washing, rinsing, and drying) -- 1  -RB     Toileting (which includes using toilet bed pan or urinal) -- 1  -RB     Putting on and taking off regular upper body clothing -- 1  -RB     Taking care of personal grooming (such as brushing teeth) -- 2  -RB     Eating meals -- 2  -RB     AM-PAC 6 Clicks Score (OT) -- 8  -RB            Functional Assessment    Outcome Measure Options AM-PAC 6 Clicks Basic Mobility (PT)  -LATONYA AM-PAC 6 Clicks Daily Activity (OT)  -RB           User Key  (r) = Recorded By, (t) = Taken By, (c) = Cosigned By    Initials Name Provider Type    RB Fran Jain OT Occupational Therapist    Cristopher Barriga, PTA Physical Therapy Assistant                 Time Calculation:    PT Charges     Row Name 03/07/22 9413             Time Calculation    Start Time 1230  -RW      Stop Time  1250  -RW      Time Calculation (min) 20 min  -RW              Time Calculation- PT    Total Timed Code Minutes- PT 20 minute(s)  -RW              Timed Charges    89495 - PT Therapeutic Activity Minutes 20  -RW              Total Minutes    Timed Charges Total Minutes 20  -RW       Total Minutes 20  -RW            User Key  (r) = Recorded By, (t) = Taken By, (c) = Cosigned By    Initials Name Provider Type    Cristofer Ron PTA Physical Therapy Assistant              Therapy Charges for Today     Code Description Service Date Service Provider Modifiers Qty    34449054440 HC PT THERAPEUTIC ACT EA 15 MIN 3/7/2022 Cristofer Boss PTA GP 1    72232489968 HC PT THER SUPP EA 15 MIN 3/7/2022 Cristofer Boss, PEE GP 1          PT G-Codes  Outcome Measure Options: AM-PAC 6 Clicks Basic Mobility (PT)  AM-PAC 6 Clicks Score (PT): 6  AM-PAC 6 Clicks Score (OT): 8    Cristofer Boss PTA  3/7/2022

## 2022-03-07 NOTE — PLAN OF CARE
Goal Outcome Evaluation:  Plan of Care Reviewed With: patient        Progress: no change  Outcome Evaluation: Pt w/ breakfast tray upon arrival. Pt dep for feeding and tray set up. Pt also dep for Oral Care while longsitting in bed. ont OT POC.

## 2022-03-07 NOTE — SIGNIFICANT NOTE
03/07/22 1001   OTHER   Discipline physical therapy assistant   Rehab Time/Intention   Session Not Performed patient/family declined treatment   Declines at this time, but is encouraged to allow staff to check back later. Risks of immobility explained.

## 2022-03-07 NOTE — PROGRESS NOTES
[facility] Medicine Services  INPATIENT PROGRESS NOTE    Length of Stay: 4  Date of Admission: 3/3/2022  Primary Care Physician: Randall Be MD    Subjective   Chief Complaint: Generalized weakness    HPI: Patient complains of severe low back pain.  He also had some urinary retention today and had 700 cc of urine with straight cath.  Patient son and daughter were at bedside and were counseled.  Patient is now agreeable to doing CT of the abdomen and pelvis as well as CT chest to evaluate for source of MSSA bacteremia.  He had some shortness of breath this morning.  IV fluids have been discontinued-COLIN and rhabdomyolysis have resolved.    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  We will do MRI of the lumbar spine to evaluate possible paraspinal abscess.  However CT scan of the lumbar spine area this admission did not show any obvious lesion.    Review of Systems   Constitutional: Negative for activity change, appetite change, chills, fatigue and fever.   HENT: Negative for congestion, sore throat and trouble swallowing.    Respiratory: Positive for shortness of breath. Negative for cough, chest tightness and wheezing.    Cardiovascular: Negative for chest pain, palpitations and leg swelling.   Gastrointestinal: Negative for abdominal distention, abdominal pain, diarrhea, nausea and vomiting.   Genitourinary: Negative for difficulty urinating, dysuria and hematuria.   Musculoskeletal: Positive for back pain. Negative for arthralgias and myalgias.   Skin: Negative for pallor and rash.   Neurological: Negative for dizziness, syncope, weakness, light-headedness and headaches.   Hematological: Negative for adenopathy. Does not bruise/bleed easily.   Psychiatric/Behavioral: Negative for agitation and confusion. The patient is  not nervous/anxious.      Objective    Temp:  [96.3 °F (35.7 °C)-99.9 °F (37.7 °C)] 96.3 °F (35.7 °C)  Heart Rate:  [] 103  Resp:  [16-18] 16  BP: (127-168)/(64-80) 148/74    Physical Exam  Constitutional:       General: He is not in acute distress.     Appearance: He is well-developed. He is not diaphoretic.   HENT:      Head: Normocephalic.   Eyes:      General: No scleral icterus.     Conjunctiva/sclera: Conjunctivae normal.      Pupils: Pupils are equal, round, and reactive to light.   Neck:      Thyroid: No thyromegaly.      Vascular: No JVD.      Trachea: No tracheal deviation.   Cardiovascular:      Rate and Rhythm: Normal rate and regular rhythm.      Heart sounds: Normal heart sounds. No murmur heard.    No friction rub. No gallop.   Pulmonary:      Effort: Pulmonary effort is normal. No respiratory distress.      Breath sounds: No stridor. Wheezing and rales present.   Chest:      Chest wall: No tenderness.   Abdominal:      General: Bowel sounds are normal. There is no distension.      Palpations: Abdomen is soft. There is no mass.      Tenderness: There is no abdominal tenderness. There is no guarding or rebound.      Hernia: No hernia is present.   Musculoskeletal:         General: No tenderness or deformity. Normal range of motion.      Cervical back: Normal range of motion and neck supple.      Right lower leg: No edema.      Left lower leg: No edema.   Lymphadenopathy:      Cervical: No cervical adenopathy.   Skin:     General: Skin is warm and dry.      Coloration: Skin is not pale.      Findings: No erythema or rash.   Neurological:      General: No focal deficit present.      Mental Status: He is alert and oriented to person, place, and time.      Cranial Nerves: No cranial nerve deficit.      Sensory: No sensory deficit.      Motor: No abnormal muscle tone.      Coordination: Coordination normal.   Psychiatric:         Mood and Affect: Mood normal.         Behavior: Behavior normal.          Thought Content: Thought content normal.       Medication Review:    Current Facility-Administered Medications:   •  aluminum-magnesium hydroxide-simethicone (MAALOX MAX) 400-400-40 MG/5ML suspension 15 mL, 15 mL, Oral, Q6H PRN, Chandan Lee MD  •  ceFAZolin in 0.9% normal saline (ANCEF) IVPB solution 2 g, 2 g, Intravenous, Q8H, Manuel Jim MD, Last Rate: 200 mL/hr at 03/07/22 1234, 2 g at 03/07/22 1234  •  furosemide (LASIX) injection 20 mg, 20 mg, Intravenous, Daily, Pattie Pascual MD, 20 mg at 03/07/22 1609  •  heparin (porcine) 5000 UNIT/ML injection 5,000 Units, 5,000 Units, Subcutaneous, Q8H, Chandan Lee MD, 5,000 Units at 03/07/22 1409  •  hydroxychloroquine (PLAQUENIL) tablet 400 mg, 400 mg, Oral, Q24H, Kaveh Pineda MD, 400 mg at 03/07/22 0842  •  ipratropium-albuterol (DUO-NEB) nebulizer solution 3 mL, 3 mL, Nebulization, Q4H PRN, Pattie Pascual MD  •  melatonin tablet 6 mg, 6 mg, Oral, Nightly PRN, Chandan Lee MD  •  nicotine (NICODERM CQ) 14 MG/24HR patch 1 patch, 1 patch, Transdermal, Q24H, Kaveh Pineda MD, 1 patch at 03/07/22 0843  •  ondansetron (ZOFRAN) injection 4 mg, 4 mg, Intravenous, Q6H PRN, Chandan Lee MD  •  oxyCODONE (ROXICODONE) immediate release tablet 5 mg, 5 mg, Oral, Q4H PRN, Chandan Lee MD, 5 mg at 03/07/22 1613  •  polyethylene glycol (MIRALAX) packet 17 g, 17 g, Oral, Daily, Pattie Pascual MD, 17 g at 03/07/22 1613  •  predniSONE (DELTASONE) tablet 5 mg, 5 mg, Oral, Daily With Breakfast, Kaveh Pineda MD, 5 mg at 03/07/22 0842  •  sennosides-docusate (PERICOLACE) 8.6-50 MG per tablet 2 tablet, 2 tablet, Oral, BID, Pattie Pascual MD, 2 tablet at 03/07/22 1409  •  sodium chloride 0.9 % flush 10 mL, 10 mL, Intravenous, Q12H, Chandan Lee MD, 10 mL at 03/07/22 0843  •  sodium chloride 0.9 % flush 10 mL, 10 mL, Intravenous, PRN, Chandan Lee MD  •  tamsulosin (FLOMAX) 24 hr capsule 0.4 mg, 0.4 mg, Oral, Daily, Samara,  Pattie HARO MD, 0.4 mg at 03/07/22 1246  •  traMADol (ULTRAM) tablet 25 mg, 25 mg, Oral, Q6H PRN, Chandan Lee MD, 25 mg at 03/06/22 2121    I have reviewed the patient's current medications.     Results Review:  I have reviewed the labs, radiology results, and diagnostic studies.    Laboratory Data:   Results from last 7 days   Lab Units 03/07/22  1236 03/06/22  0545 03/04/22  0648 03/03/22  1031   SODIUM mmol/L 129* 132* 134* 137   POTASSIUM mmol/L 3.7 3.7 4.0 4.4   CHLORIDE mmol/L 95* 97* 102 101   CO2 mmol/L 23.0 26.0 17.0* 22.0   BUN mg/dL 23 18 36* 37*   CREATININE mg/dL 0.70* 0.77 1.06 1.45*   GLUCOSE mg/dL 148* 126* 105* 120*   CALCIUM mg/dL 7.9* 8.2* 8.4* 9.7   BILIRUBIN mg/dL  --  0.6 0.5 1.2   ALK PHOS U/L  --  76 49 61   ALT (SGPT) U/L  --  17 42* 38   AST (SGOT) U/L  --  44* 114* 143*   ANION GAP mmol/L 11.0 9.0 15.0 14.0     Estimated Creatinine Clearance: 85.3 mL/min (A) (by C-G formula based on SCr of 0.7 mg/dL (L)).  Results from last 7 days   Lab Units 03/04/22  0648 03/03/22  1031   MAGNESIUM mg/dL 2.1 2.1   PHOSPHORUS mg/dL 2.5  --          Results from last 7 days   Lab Units 03/07/22  1236 03/06/22  0545 03/04/22  0648 03/03/22  1032   WBC 10*3/mm3 18.27* 14.27* 7.35 12.82*   HEMOGLOBIN g/dL 13.6 12.8* 13.4 16.0   HEMATOCRIT % 38.8 36.6* 40.6 47.1   PLATELETS 10*3/mm3 149 118* 118* 195           Culture Data:   No results found for: BLOODCX  No results found for: URINECX  No results found for: RESPCX  No results found for: WOUNDCX  No results found for: STOOLCX  No components found for: BODYFLD    Radiology Data:   Imaging Results (Last 24 Hours)     Procedure Component Value Units Date/Time    MRI Lumbar Spine With & Without Contrast [[id number]] Resulted: 03/07/22 1710     Updated: 03/07/22 1710    CT Abdomen Pelvis With Contrast [[id number]] Collected: 03/07/22 1504     Updated: 03/07/22 1557    Narrative:        PROCEDURE: CT chest abdomen and pelvis with contrast    REASON FOR EXAM:  Pneumonia, effusion or abscess suspected, xray  done, M62.82 Rhabdomyolysis J18.9 Pneumonia, unspecified organism  Z74.09 Other reduced mobility Z78.9 Other specified health status  Z74.09 Other reduced mobility    This exam was performed according to our departmental  dose-optimization program, which includes automated exposure  control, adjustment of the mA and/or kV according to patient size  and/or use of iterative reconstruction technique.    FINDINGS: Axial computer tomography sequential imaging was  performed from the thoracic inlet through the symphysis pubis  after administration of IV contrast. .Sagittal and coronal  reformation was performed .    Ascending thoracic fusiform aneurysm which measures 4.33 x 4.51  cm in AP and transverse dimension. Otherwise mediastinal  structures of the chest are normal. There is no lymphadenopathy  or pericardial effusion. Just superior to the sternal manubrium  involving the anterior superior chest wall, there is an oval  mildly hyperdense fluid collection which has Hounsfield units of  28 and measures 2.07 x 1.79 x 1.44 cm. Mild centrilobular  emphysematous changes. Right upper lobe posterior segment and  right lower lobe posterior basilar segment peripheral coarse  interstitial stranding with honeycombing. The lungs are otherwise  clear. Pleural spaces are normal.    The liver is normal. The gallbladder is normal. The pancreas is  normal. The spleen is normal. Bilateral adrenal glands are  normal. Right kidney upper pole and lower pole small foci of  diminished renal parenchymal enhancement. Associated small amount  of right perirenal space fluid and fatty stranding is seen. The  right kidney and ureter are otherwise normal. Left kidney  perirenal space small amount of fatty stranding and fluid. The  left kidney and ureter are otherwise normal. The bladder and  prostate are normal. The hollow viscera is normal. No  lymphadenopathy in the abdomen or the pelvis. No acute  osseous  abnormality. Postsurgical changes of the lumbar spine with  bilateral posterior ihsan fusion of the L3 and L4 vertebral bodies  with satisfactory anatomic alignment. Postsurgical changes  consistent with L3-4 discectomy with bone graft placement within  the intervertebral disc space. Cortical screws seen fixating the  superior right L4-5 facet joint. L5 left transverse pedicle  screws in place. Right bipolar hip prostheses in place.  Postsurgical changes consistent with L4-5 discectomy with bone  graft placed within the intervertebral disc space. Small amount  of fluid in the presacral space of uncertain clinical  significance.      Impression:      1.Ascending thoracic fusiform aneurysm which measures 4.33 x 4.51  cm in AP and transverse dimension.   2.Just superior to the sternal manubrium involving the anterior  superior chest wall, there is an oval mildly hyperdense fluid  collection which has Hounsfield units of 28 and measures 2.07 x  1.79 x 1.44 cm. The differential for this lesion would include  possible posttraumatic hematoma/seroma versus atypical appearing  sebaceous cyst versus less likely abscess or cystic mass.  Recommend clinical correlation.  3. Mild centrilobular emphysematous changes.  4.Right upper lobe posterior segment and right lower lobe  posterior basilar segment peripheral coarse interstitial  stranding with honeycombing. This would be suspicious for  pulmonary fibrosis.  5.Right kidney upper pole and lower pole small foci of diminished  renal parenchymal enhancement. Associated small amount of right  perirenal space fluid and fatty stranding is seen. These findings  would be suspicious for changes from pyelonephritis.  6. Left kidney perirenal space small amount of fatty stranding  and fluid. This would be suspicious for inflammatory changes.  Recommend clinical correlation.  7. Postsurgical changes of the lumbar spine and right hip  described above.  8. Small amount of fluid in the  presacral space of uncertain  clinical significance.    Electronically signed by:  César Tran MD  3/7/2022 3:55 PM CST  Workstation: [id number]    CT Chest With Contrast Diagnostic [[id number]] Collected: 03/07/22 1504     Updated: 03/07/22 1557    Narrative:        PROCEDURE: CT chest abdomen and pelvis with contrast    REASON FOR EXAM: Pneumonia, effusion or abscess suspected, xray  done, M62.82 Rhabdomyolysis J18.9 Pneumonia, unspecified organism  Z74.09 Other reduced mobility Z78.9 Other specified health status  Z74.09 Other reduced mobility    This exam was performed according to our departmental  dose-optimization program, which includes automated exposure  control, adjustment of the mA and/or kV according to patient size  and/or use of iterative reconstruction technique.    FINDINGS: Axial computer tomography sequential imaging was  performed from the thoracic inlet through the symphysis pubis  after administration of IV contrast. .Sagittal and coronal  reformation was performed .    Ascending thoracic fusiform aneurysm which measures 4.33 x 4.51  cm in AP and transverse dimension. Otherwise mediastinal  structures of the chest are normal. There is no lymphadenopathy  or pericardial effusion. Just superior to the sternal manubrium  involving the anterior superior chest wall, there is an oval  mildly hyperdense fluid collection which has Hounsfield units of  28 and measures 2.07 x 1.79 x 1.44 cm. Mild centrilobular  emphysematous changes. Right upper lobe posterior segment and  right lower lobe posterior basilar segment peripheral coarse  interstitial stranding with honeycombing. The lungs are otherwise  clear. Pleural spaces are normal.    The liver is normal. The gallbladder is normal. The pancreas is  normal. The spleen is normal. Bilateral adrenal glands are  normal. Right kidney upper pole and lower pole small foci of  diminished renal parenchymal enhancement. Associated small amount  of right perirenal  space fluid and fatty stranding is seen. The  right kidney and ureter are otherwise normal. Left kidney  perirenal space small amount of fatty stranding and fluid. The  left kidney and ureter are otherwise normal. The bladder and  prostate are normal. The hollow viscera is normal. No  lymphadenopathy in the abdomen or the pelvis. No acute osseous  abnormality. Postsurgical changes of the lumbar spine with  bilateral posterior ihsan fusion of the L3 and L4 vertebral bodies  with satisfactory anatomic alignment. Postsurgical changes  consistent with L3-4 discectomy with bone graft placement within  the intervertebral disc space. Cortical screws seen fixating the  superior right L4-5 facet joint. L5 left transverse pedicle  screws in place. Right bipolar hip prostheses in place.  Postsurgical changes consistent with L4-5 discectomy with bone  graft placed within the intervertebral disc space. Small amount  of fluid in the presacral space of uncertain clinical  significance.      Impression:      1.Ascending thoracic fusiform aneurysm which measures 4.33 x 4.51  cm in AP and transverse dimension.   2.Just superior to the sternal manubrium involving the anterior  superior chest wall, there is an oval mildly hyperdense fluid  collection which has Hounsfield units of 28 and measures 2.07 x  1.79 x 1.44 cm. The differential for this lesion would include  possible posttraumatic hematoma/seroma versus atypical appearing  sebaceous cyst versus less likely abscess or cystic mass.  Recommend clinical correlation.  3. Mild centrilobular emphysematous changes.  4.Right upper lobe posterior segment and right lower lobe  posterior basilar segment peripheral coarse interstitial  stranding with honeycombing. This would be suspicious for  pulmonary fibrosis.  5.Right kidney upper pole and lower pole small foci of diminished  renal parenchymal enhancement. Associated small amount of right  perirenal space fluid and fatty stranding is  seen. These findings  would be suspicious for changes from pyelonephritis.  6. Left kidney perirenal space small amount of fatty stranding  and fluid. This would be suspicious for inflammatory changes.  Recommend clinical correlation.  7. Postsurgical changes of the lumbar spine and right hip  described above.  8. Small amount of fluid in the presacral space of uncertain  clinical significance.    Electronically signed by:  César Tran MD  3/7/2022 3:55 PM CST  Workstation: [id number]          Assessment/Plan     Hospital Problem List:  Active Problems:    Non-traumatic rhabdomyolysis  MSSA bacteremia  Lumbar pain  Generalized weakness  COLIN  Carotid stenosis  Urinary retention    Plan  Patient complains of severe low back pain.  He also had some urinary retention today and had 700 cc of urine with straight cath.  We will start Flomax and continue intermittent straight catheterization for now.  If urinary retention continues will place [facility].    Patient is now agreeable to doing CT of the abdomen and pelvis as well as CT chest to evaluate for source of MSSA bacteremia.  Continue IV cefazolin.  Repeat blood cultures today to evaluate for clearing.  Leukocytosis persists.  Initial transthoracic echocardiogram did not show any sign of vegetation.  May consider transesophageal echocardiogram if indicated.    Patient had some shortness of breath this morning.  IV fluids have been discontinued-COLIN and rhabdomyolysis have resolved.  We will start IV Lasix 20 mg daily and DuoNebs for shortness of breath and wheezing.    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  We will do MRI of the lumbar spine to evaluate possible paraspinal abscess.  However CT scan of the lumbar spine area this admission did not show any obvious lesion.    Continue PT/OT for generalized weakness    Outpatient  follow-up with vascular surgery for carotid stenosis.    DVT prophylaxis with SCDs    CODE STATUS is full code    Discharge Planning: In progress    I confirmed that the patient's Advance Care Plan is present, code status is documented, or surrogate decision maker is listed in the patient's medical record.      I have utilized all available immediate resources to obtain, update, or review the patient's current medications.      Pattie Pascual MD   03/07/22   17:17 CST

## 2022-03-07 NOTE — PLAN OF CARE
Goal Outcome Evaluation:              Outcome Evaluation: no fall, pain controled with meds, meds given as ordered test done as ordered, labs monitored

## 2022-03-07 NOTE — PLAN OF CARE
Goal Outcome Evaluation:  Plan of Care Reviewed With: patient        Progress: no change  Outcome Evaluation: 2nd attempt today. pt with family and nurse in room. agrees to some therex, no mobility attempted this visit. tolerated some easy therex with some mild resistive hip extension. agrees to ordered scans and now nurse back to in to cath pt. attempt to mobilize as able.

## 2022-03-07 NOTE — NURSING NOTE
Notified Dr Pascual about patient family request ct scan for back pain, patient congested on iv fluids with sodium bicarb, notified of no labs today, notified of edema to arms bilateral and no bm for multiple days md states will addressee all issues

## 2022-03-08 PROBLEM — M46.47 DISCITIS OF LUMBOSACRAL REGION: Status: ACTIVE | Noted: 2022-01-01

## 2022-03-08 PROBLEM — E83.51 HYPOCALCEMIA: Status: ACTIVE | Noted: 2022-01-01

## 2022-03-08 PROBLEM — K68.12 PSOAS ABSCESS (HCC): Status: ACTIVE | Noted: 2022-01-01

## 2022-03-08 PROBLEM — E87.1 HYPONATREMIA: Status: ACTIVE | Noted: 2022-01-01

## 2022-03-08 PROBLEM — J18.9 CAP (COMMUNITY ACQUIRED PNEUMONIA): Status: ACTIVE | Noted: 2022-01-01

## 2022-03-08 PROBLEM — N17.9 AKI (ACUTE KIDNEY INJURY) (HCC): Status: ACTIVE | Noted: 2022-01-01

## 2022-03-08 PROBLEM — Z79.84 LONG TERM (CURRENT) USE OF ORAL HYPOGLYCEMIC DRUGS: Status: ACTIVE | Noted: 2022-01-01

## 2022-03-08 PROBLEM — G06.1 ABSCESS IN EPIDURAL SPACE OF LUMBAR SPINE: Status: ACTIVE | Noted: 2022-01-01

## 2022-03-08 PROBLEM — E88.09 HYPOALBUMINEMIA: Status: ACTIVE | Noted: 2022-01-01

## 2022-03-08 PROBLEM — R73.9 HYPERGLYCEMIA: Status: ACTIVE | Noted: 2022-01-01

## 2022-03-08 PROBLEM — J18.9 CAP (COMMUNITY ACQUIRED PNEUMONIA): Status: RESOLVED | Noted: 2022-01-01 | Resolved: 2022-01-01

## 2022-03-08 PROBLEM — Z79.84 LONG TERM (CURRENT) USE OF ORAL HYPOGLYCEMIC DRUGS: Status: RESOLVED | Noted: 2022-01-01 | Resolved: 2022-01-01

## 2022-03-08 NOTE — SIGNIFICANT NOTE
[facility] Medicine Services  SIGNIFICANT   NOTE    Length of Stay: 4  Date of Admission: 3/3/2022  Primary Care Physician: Randall Be MD    Subjective   Chief Complaint:   MRI results evidencing large epidural abscess, discitis, and psoas abscess    HPI:    Patient presented on 3 March 2022 with new lower limb weakness and falls and was found to have, on today's MRI and CT Abdo pelvis imaging, epidural abscess/discitis/psoas abscess.  The patient also states that he has had urinary retention during this admission and [possibly] in the day prior.  He is already had to be cathed at least once during this admission, although currently he is passing urine without issue.  Patient states that he has had some new worsening of his low back pain which is a chronic issue from multiple prior surgeries, as well as rheumatoid arthritis, and multiple other factors.    I spoken to the patient about the results, although he was already aware that there was a concern for the infectious sources that have been identified.  I have advised the patient of the likely course of action that we will need to take in terms of transfer with the consideration for surgical intervention.      Review of Systems   Constitutional: Positive for activity change. Negative for appetite change, chills, diaphoresis, fatigue and fever.   HENT: Negative for congestion, ear pain, rhinorrhea, sinus pressure, sore throat and tinnitus.    Respiratory: Negative for cough, shortness of breath, wheezing and stridor.    Gastrointestinal: Negative for abdominal distention, abdominal pain, constipation, diarrhea, nausea and vomiting.   Genitourinary: Positive for difficulty urinating (with episodes of retention). Negative for dysuria, enuresis, flank pain, frequency, hematuria and urgency.   Musculoskeletal: Positive for back pain (lumbar) and gait problem.   Skin: Negative for color change, pallor, rash and  wound.   Neurological: Positive for weakness (lower limbs) and numbness. Negative for tremors, seizures, syncope, facial asymmetry, speech difficulty, light-headedness and headaches.   Psychiatric/Behavioral: Negative for agitation, confusion and hallucinations.        All pertinent negatives and positives are as above. All other systems have been reviewed and are negative unless otherwise stated.     Objective    Temp:  [96.3 °F (35.7 °C)-99.9 °F (37.7 °C)] 97.3 °F (36.3 °C)  Heart Rate:  [] 97  Resp:  [16-20] 20  BP: (113-168)/(64-80) 113/72    Physical Exam  HENT:      Head: Normocephalic and atraumatic.      Nose: Nose normal. No congestion.      Mouth/Throat:      Mouth: Mucous membranes are moist.      Pharynx: Oropharynx is clear. No oropharyngeal exudate or posterior oropharyngeal erythema.   Eyes:      General: No visual field deficit or scleral icterus.     Extraocular Movements: Extraocular movements intact.      Conjunctiva/sclera: Conjunctivae normal.   Cardiovascular:      Rate and Rhythm: Normal rate and regular rhythm.   Musculoskeletal:         General: No swelling, tenderness or deformity.      Right lower leg: No edema.      Left lower leg: No edema.   Skin:     Capillary Refill: Capillary refill takes less than 2 seconds.      Coloration: Skin is not jaundiced.      Findings: No erythema, lesion or rash.   Neurological:      Mental Status: He is alert and oriented to person, place, and time.      GCS: GCS eye subscore is 4. GCS verbal subscore is 5. GCS motor subscore is 6.      Cranial Nerves: No cranial nerve deficit, dysarthria or facial asymmetry.      Sensory: Sensory deficit present.      Motor: Weakness and atrophy present.      Comments: Hip - 3/5  Knee - 4/5  Ankle - 5/5    Sensation reduced L4-S1    *Gait unable to be assessed*         Results Review:  I have reviewed the labs, radiology results, and diagnostic studies.    Laboratory Data:   Results from last 7 days   Lab Units  03/07/22  1236 03/06/22  0545 03/04/22  0648 03/03/22  1031   SODIUM mmol/L 129* 132* 134* 137   POTASSIUM mmol/L 3.7 3.7 4.0 4.4   CHLORIDE mmol/L 95* 97* 102 101   CO2 mmol/L 23.0 26.0 17.0* 22.0   BUN mg/dL 23 18 36* 37*   CREATININE mg/dL 0.70* 0.77 1.06 1.45*   GLUCOSE mg/dL 148* 126* 105* 120*   CALCIUM mg/dL 7.9* 8.2* 8.4* 9.7   BILIRUBIN mg/dL  --  0.6 0.5 1.2   ALK PHOS U/L  --  76 49 61   ALT (SGPT) U/L  --  17 42* 38   AST (SGOT) U/L  --  44* 114* 143*   ANION GAP mmol/L 11.0 9.0 15.0 14.0     Estimated Creatinine Clearance: 85.3 mL/min (A) (by C-G formula based on SCr of 0.7 mg/dL (L)).  Results from last 7 days   Lab Units 03/04/22  0648 03/03/22  1031   MAGNESIUM mg/dL 2.1 2.1   PHOSPHORUS mg/dL 2.5  --          Results from last 7 days   Lab Units 03/07/22  1236 03/06/22  0545 03/04/22  0648 03/03/22  1032   WBC 10*3/mm3 18.27* 14.27* 7.35 12.82*   HEMOGLOBIN g/dL 13.6 12.8* 13.4 16.0   HEMATOCRIT % 38.8 36.6* 40.6 47.1   PLATELETS 10*3/mm3 149 118* 118* 195           Culture Data:   No results found for: BLOODCX  No results found for: URINECX  No results found for: RESPCX  No results found for: WOUNDCX  No results found for: STOOLCX  No components found for: BODYFLD    Radiology Data:   Imaging Results (Last 24 Hours)     Procedure Component Value Units Date/Time    MRI Lumbar Spine With & Without Contrast [[id number]] Collected: 03/07/22 1704     Updated: 03/07/22 2128    Narrative:      INDICATION: Low back pain, prior surgery, new symptoms, M62.82  Rhabdomyolysis J18.9 Pneumonia, unspecified organism Z74.09 Other  reduced mobility Z78.9 Other specified health status Z74.09 Other  reduced mobility    EXAMINATION: MRI - MR LUMBAR SPINE WITHOUT THEN WITH IV CONTRAST      TECHNIQUE:    Multiplanar and multisequence MR images of the lumbar spine.    IV Contrast Dosage and Agent: 17 mL of ProHance    COMPARISON: None.  ____________________________________________    FINDINGS:    VERTEBRAE: There is  slight loss of vertebral body height at L3.  There is no evidence of acute fracture. There is mild bone marrow  edema within L2 on L3. No obvious osseous enhancement is seen  following contrast injection. Presence of transpedicular screws  and posterior rods does create some artifact.  VERTEBRAL ALIGNMENT: There is a mild retrolisthesis of L3 on L4.  There is no anterolisthesis. There is straightening of the normal  lumbar lordosis  CORD: Distal cord is normal. Conus medullaris is displaced  anteriorly due to epidural fluid collection. Within the upper  lumbar canal there is an epidural fluid collection posteriorly on  the right. This measures 1.7 x 0.8 x 5.7 cm and extends from the  T12-L1 disc level to the L2-3 disc level. This has an enhancing  wall. Findings are concerning for epidural abscess.    L1/L2: There is significant canal narrowing due to the epidural  abscess. There is degenerative disc disease. Facet joints are  unremarkable    L2/L3: There is significant canal narrowing due to the epidural  fluid collection. There is fluid signal within the disc space  concerning for discitis. There is enhancement in the soft tissues  surrounding the L2-3 disc without evidence of psoas abscess.    L3/L4: There is fluid signal interspace concerning for discitis.  There are inflammatory changes in the soft tissues. There is a  small psoas fluid collection on the right with peripheral  enhancement measuring 1.8 cm. This is consistent with an abscess.    L4/L5: Disc space is unremarkable. Canal appears patent.  Multiloculated abscess is identified within the right psoas  muscle.    L5/S1: There is fluid in the disc space concerning for discitis.  Lumbar canal is patent. There is mild facet arthropathy.    SOFT TISSUES: As stated above there is bilateral psoas muscle  enhancement with right psoas abscess formation. Other  retroperitoneal structures are unremarkable      Impression:        1.  5.7 cm long epidural abscess  within the posterior canal on  the right of midline extending from T12-L1 level through L2-L3.  2.  The epidural abscess results in significant canal stenosis at  L2-3 and L1-2.  3.  Findings consistent with discitis at L2-3, L3-4, and L5-S1  with adjacent enhancement of the psoas muscle multiloculated  abscess in the right psoas muscle    Electronically signed by:  Jonnie Nixon MD  3/7/2022 9:25 PM CST  Workstation: [id number]    CT Abdomen Pelvis With Contrast [[id number]] Collected: 03/07/22 1504     Updated: 03/07/22 1557    Narrative:        PROCEDURE: CT chest abdomen and pelvis with contrast    REASON FOR EXAM: Pneumonia, effusion or abscess suspected, xray  done, M62.82 Rhabdomyolysis J18.9 Pneumonia, unspecified organism  Z74.09 Other reduced mobility Z78.9 Other specified health status  Z74.09 Other reduced mobility    This exam was performed according to our departmental  dose-optimization program, which includes automated exposure  control, adjustment of the mA and/or kV according to patient size  and/or use of iterative reconstruction technique.    FINDINGS: Axial computer tomography sequential imaging was  performed from the thoracic inlet through the symphysis pubis  after administration of IV contrast. .Sagittal and coronal  reformation was performed .    Ascending thoracic fusiform aneurysm which measures 4.33 x 4.51  cm in AP and transverse dimension. Otherwise mediastinal  structures of the chest are normal. There is no lymphadenopathy  or pericardial effusion. Just superior to the sternal manubrium  involving the anterior superior chest wall, there is an oval  mildly hyperdense fluid collection which has Hounsfield units of  28 and measures 2.07 x 1.79 x 1.44 cm. Mild centrilobular  emphysematous changes. Right upper lobe posterior segment and  right lower lobe posterior basilar segment peripheral coarse  interstitial stranding with honeycombing. The lungs are otherwise  clear. Pleural spaces are  normal.    The liver is normal. The gallbladder is normal. The pancreas is  normal. The spleen is normal. Bilateral adrenal glands are  normal. Right kidney upper pole and lower pole small foci of  diminished renal parenchymal enhancement. Associated small amount  of right perirenal space fluid and fatty stranding is seen. The  right kidney and ureter are otherwise normal. Left kidney  perirenal space small amount of fatty stranding and fluid. The  left kidney and ureter are otherwise normal. The bladder and  prostate are normal. The hollow viscera is normal. No  lymphadenopathy in the abdomen or the pelvis. No acute osseous  abnormality. Postsurgical changes of the lumbar spine with  bilateral posterior ihsan fusion of the L3 and L4 vertebral bodies  with satisfactory anatomic alignment. Postsurgical changes  consistent with L3-4 discectomy with bone graft placement within  the intervertebral disc space. Cortical screws seen fixating the  superior right L4-5 facet joint. L5 left transverse pedicle  screws in place. Right bipolar hip prostheses in place.  Postsurgical changes consistent with L4-5 discectomy with bone  graft placed within the intervertebral disc space. Small amount  of fluid in the presacral space of uncertain clinical  significance.      Impression:      1.Ascending thoracic fusiform aneurysm which measures 4.33 x 4.51  cm in AP and transverse dimension.   2.Just superior to the sternal manubrium involving the anterior  superior chest wall, there is an oval mildly hyperdense fluid  collection which has Hounsfield units of 28 and measures 2.07 x  1.79 x 1.44 cm. The differential for this lesion would include  possible posttraumatic hematoma/seroma versus atypical appearing  sebaceous cyst versus less likely abscess or cystic mass.  Recommend clinical correlation.  3. Mild centrilobular emphysematous changes.  4.Right upper lobe posterior segment and right lower lobe  posterior basilar segment  peripheral coarse interstitial  stranding with honeycombing. This would be suspicious for  pulmonary fibrosis.  5.Right kidney upper pole and lower pole small foci of diminished  renal parenchymal enhancement. Associated small amount of right  perirenal space fluid and fatty stranding is seen. These findings  would be suspicious for changes from pyelonephritis.  6. Left kidney perirenal space small amount of fatty stranding  and fluid. This would be suspicious for inflammatory changes.  Recommend clinical correlation.  7. Postsurgical changes of the lumbar spine and right hip  described above.  8. Small amount of fluid in the presacral space of uncertain  clinical significance.    Electronically signed by:  César Tran MD  3/7/2022 3:55 PM CST  Workstation: [id number]    CT Chest With Contrast Diagnostic [[id number]] Collected: 03/07/22 1504     Updated: 03/07/22 1557    Narrative:        PROCEDURE: CT chest abdomen and pelvis with contrast    REASON FOR EXAM: Pneumonia, effusion or abscess suspected, xray  done, M62.82 Rhabdomyolysis J18.9 Pneumonia, unspecified organism  Z74.09 Other reduced mobility Z78.9 Other specified health status  Z74.09 Other reduced mobility    This exam was performed according to our departmental  dose-optimization program, which includes automated exposure  control, adjustment of the mA and/or kV according to patient size  and/or use of iterative reconstruction technique.    FINDINGS: Axial computer tomography sequential imaging was  performed from the thoracic inlet through the symphysis pubis  after administration of IV contrast. .Sagittal and coronal  reformation was performed .    Ascending thoracic fusiform aneurysm which measures 4.33 x 4.51  cm in AP and transverse dimension. Otherwise mediastinal  structures of the chest are normal. There is no lymphadenopathy  or pericardial effusion. Just superior to the sternal manubrium  involving the anterior superior chest wall, there is  an oval  mildly hyperdense fluid collection which has Hounsfield units of  28 and measures 2.07 x 1.79 x 1.44 cm. Mild centrilobular  emphysematous changes. Right upper lobe posterior segment and  right lower lobe posterior basilar segment peripheral coarse  interstitial stranding with honeycombing. The lungs are otherwise  clear. Pleural spaces are normal.    The liver is normal. The gallbladder is normal. The pancreas is  normal. The spleen is normal. Bilateral adrenal glands are  normal. Right kidney upper pole and lower pole small foci of  diminished renal parenchymal enhancement. Associated small amount  of right perirenal space fluid and fatty stranding is seen. The  right kidney and ureter are otherwise normal. Left kidney  perirenal space small amount of fatty stranding and fluid. The  left kidney and ureter are otherwise normal. The bladder and  prostate are normal. The hollow viscera is normal. No  lymphadenopathy in the abdomen or the pelvis. No acute osseous  abnormality. Postsurgical changes of the lumbar spine with  bilateral posterior ihsan fusion of the L3 and L4 vertebral bodies  with satisfactory anatomic alignment. Postsurgical changes  consistent with L3-4 discectomy with bone graft placement within  the intervertebral disc space. Cortical screws seen fixating the  superior right L4-5 facet joint. L5 left transverse pedicle  screws in place. Right bipolar hip prostheses in place.  Postsurgical changes consistent with L4-5 discectomy with bone  graft placed within the intervertebral disc space. Small amount  of fluid in the presacral space of uncertain clinical  significance.      Impression:      1.Ascending thoracic fusiform aneurysm which measures 4.33 x 4.51  cm in AP and transverse dimension.   2.Just superior to the sternal manubrium involving the anterior  superior chest wall, there is an oval mildly hyperdense fluid  collection which has Hounsfield units of 28 and measures 2.07 x  1.79 x  1.44 cm. The differential for this lesion would include  possible posttraumatic hematoma/seroma versus atypical appearing  sebaceous cyst versus less likely abscess or cystic mass.  Recommend clinical correlation.  3. Mild centrilobular emphysematous changes.  4.Right upper lobe posterior segment and right lower lobe  posterior basilar segment peripheral coarse interstitial  stranding with honeycombing. This would be suspicious for  pulmonary fibrosis.  5.Right kidney upper pole and lower pole small foci of diminished  renal parenchymal enhancement. Associated small amount of right  perirenal space fluid and fatty stranding is seen. These findings  would be suspicious for changes from pyelonephritis.  6. Left kidney perirenal space small amount of fatty stranding  and fluid. This would be suspicious for inflammatory changes.  Recommend clinical correlation.  7. Postsurgical changes of the lumbar spine and right hip  described above.  8. Small amount of fluid in the presacral space of uncertain  clinical significance.    Electronically signed by:  César Tran MD  3/7/2022 3:55 PM CST  Workstation: [id number]          I have reviewed the patient's current medications.     Assessment/Plan     Active Hospital Problems    Diagnosis    • Non-traumatic rhabdomyolysis        Plan:    [facility] in [place] have accepted the patient by the hospitalist Dr. Hansen through the transfer center    1.  We have agreed that the patient will be transferred this evening  2.  We have agreed that the patient will remain on IV cefazolin only for now, as the culture results indicate the bacteria will be sensitive to this  3.  All results pertinent to the patient's case have been discussed  4.  All of the MRI and CT findings that are pertinent to the patient's case have been divulged and discussed  5.  I have made them aware of the patient's current medication regimen, including immunosuppressive medications.      The patient  was evaluated during the global COVID-19 pandemic, and the diagnosis was suspected/considered upon their initial presentation.  Evaluation, treatment, and testing were consistent with current guidelines for patients who present with complaints or symptoms that may be related to COVID-19.    I confirmed that the patient's Advance Care Plan is present, code status is documented, or surrogate decision maker is listed in the patient's medical record.       Manuel Jim MD     35 minutes of critical care clinical time was devoted to this patient's assessment and, ultimately, the transfer for higher level of care at a spinal surgical facility

## 2022-03-08 NOTE — DISCHARGE INSTRUCTIONS
** Transfer to [facility] for higher level of care for consideration of urgent intervention with spinal surgery/general surgery**

## 2022-03-08 NOTE — PLAN OF CARE
Goal Outcome Evaluation:  Plan of Care Reviewed With: patient        Progress: improving  Alert and oriented.Rested well between care.Medicated with oral medication with relief voiced.Oxycodone given x 2 and ultram x 1.Denied nausea.Tolerates repositioning fair with moaning,grimacing,and stiffening up.Waiting for EMS for transfer.

## 2022-03-08 NOTE — PLAN OF CARE
Goal Outcome Evaluation:  Plan of Care Reviewed With: patient  pain is finally under control at this time.WBC increased.V/S stable.Receiving iv kefzol.Working on transferring to [facility]  per Dr Jim order. Son notified.Patient aware and agreeable.

## 2022-03-08 NOTE — NURSING NOTE
EMS arrived to transfer pt. Cefazolin was infusing when they arrived, they are unable to transport pt's on any type of infusing medication d/t being a S ambulance. Spoke with Dr. Jim who stated to stop cefazolin and go ahead with transfer. Pt received 3/4 of the dose. I notified Erica at [facility] who verbalized understanding of this information.

## 2022-03-08 NOTE — DISCHARGE SUMMARY
[facility] Medicine Services  DISCHARGE SUMMARY     **TRANSFER  TO [facility]**      Date of Admission: 3/3/2022  Date of Discharge:  3/8/2022  Primary Care Physician: Randall Be MD    Presenting Problem/History of Present Illness:  Non-traumatic rhabdomyolysis [M62.82]  Pneumonia of right lung due to infectious organism, unspecified part of lung [J18.9]   For full H&P please see the admission note from Dr. Lee.  In brief, the patient was having increased low back pain and leg weakness resulting in decreased ambulation and multiple falls within a 24-hour period prior to admission on the 3/3/2022.    Final Discharge Diagnoses:  Active Hospital Problems    Diagnosis    • **Abscess in epidural space of lumbar spine    • Discitis of lumbosacral region    • Psoas abscess (HCC)    • Non-traumatic rhabdomyolysis    • COLIN (acute kidney injury) (HCC)    • Hyponatremia    • Hypocalcemia    • Hypoalbuminemia    • Hyperglycemia        Consults:   Consults     Date and Time Order Name Status Description    3/3/2022  1:05 PM Hospitalist (on-call MD unless specified)            Procedures Performed: None                Pertinent Test Results:   Lab Results (most recent)     Procedure Component Value Units Date/Time    Blood Culture - Blood, Arm, Right [[id number]] Collected: 03/07/22 1318    Specimen: Blood from Arm, Right Updated: 03/07/22 1325    Basic Metabolic Panel [[id number]]  (Abnormal) Collected: 03/07/22 1236    Specimen: Blood Updated: 03/07/22 1305     Glucose 148 mg/dL      BUN 23 mg/dL      Creatinine 0.70 mg/dL      Sodium 129 mmol/L      Potassium 3.7 mmol/L      Chloride 95 mmol/L      CO2 23.0 mmol/L      Calcium 7.9 mg/dL      BUN/Creatinine Ratio 32.9     Anion Gap 11.0 mmol/L      eGFR 94.9 mL/min/1.73      Comment: National Kidney Foundation and American Society of Nephrology (ASN) Task Force recommended calculation based on the Chronic  Pt given discharge instructions at this time. Reviewed recommended diet. Instructed on follow up appointment with PCP Radha Llamas on 5/30/19 at 3:10. Voiced understanding. Instructed to  medications at any pharmacy, paper scripts given. IV dc'd. Instructed on when next dose of medications are due. Voiced understanding. No complaints or questions at this time. Instructed to call out when ready to leave. Kidney Disease Epidemiology Collaboration (CKD-EPI) equation refit without adjustment for race.       Narrative:      GFR Normal >60  Chronic Kidney Disease <60  Kidney Failure <15      CK [[id number]]  (Normal) Collected: 03/07/22 1236    Specimen: Blood Updated: 03/07/22 1305     Creatine Kinase 74 U/L     CBC & Differential [[id number]]  (Abnormal) Collected: 03/07/22 1236    Specimen: Blood Updated: 03/07/22 1247    Narrative:      The following orders were created for panel order CBC & Differential.  Procedure                               Abnormality         Status                     ---------                               -----------         ------                     CBC Auto Differential[[id number]]        Abnormal            Final result               Scan Slide[[id number]]                                                                    Please view results for these tests on the individual orders.    CBC Auto Differential [[id number]]  (Abnormal) Collected: 03/07/22 1236    Specimen: Blood Updated: 03/07/22 1247     WBC 18.27 10*3/mm3      RBC 4.47 10*6/mm3      Hemoglobin 13.6 g/dL      Hematocrit 38.8 %      MCV 86.8 fL      MCH 30.4 pg      MCHC 35.1 g/dL      RDW 13.3 %      RDW-SD 42.1 fl      MPV 10.8 fL      Platelets 149 10*3/mm3      Neutrophil % 87.1 %      Lymphocyte % 2.9 %      Monocyte % 4.9 %      Eosinophil % 0.6 %      Basophil % 0.8 %      Immature Grans % 3.7 %      Neutrophils, Absolute 15.92 10*3/mm3      Lymphocytes, Absolute 0.53 10*3/mm3      Monocytes, Absolute 0.90 10*3/mm3      Eosinophils, Absolute 0.11 10*3/mm3      Basophils, Absolute 0.14 10*3/mm3      Immature Grans, Absolute 0.67 10*3/mm3      nRBC 0.0 /100 WBC     Blood Culture - Blood, Hand, Right [[id number]] Collected: 03/07/22 1230    Specimen: Blood from Hand, Right Updated: 03/07/22 1243    Comprehensive Metabolic Panel [[id number]]  (Abnormal) Collected: 03/06/22 0545    Specimen: Blood Updated: 03/06/22 0630      Glucose 126 mg/dL      BUN 18 mg/dL      Creatinine 0.77 mg/dL      Sodium 132 mmol/L      Potassium 3.7 mmol/L      Chloride 97 mmol/L      CO2 26.0 mmol/L      Calcium 8.2 mg/dL      Total Protein 5.8 g/dL      Albumin 2.00 g/dL      ALT (SGPT) 17 U/L      AST (SGOT) 44 U/L      Alkaline Phosphatase 76 U/L      Total Bilirubin 0.6 mg/dL      Globulin 3.8 gm/dL      A/G Ratio 0.5 g/dL      BUN/Creatinine Ratio 23.4     Anion Gap 9.0 mmol/L      eGFR 92.2 mL/min/1.73      Comment: National Kidney Foundation and American Society of Nephrology (ASN) Task Force recommended calculation based on the Chronic Kidney Disease Epidemiology Collaboration (CKD-EPI) equation refit without adjustment for race.       Narrative:      GFR Normal >60  Chronic Kidney Disease <60  Kidney Failure <15      CBC & Differential [[id number]]  (Abnormal) Collected: 03/06/22 0545    Specimen: Blood Updated: 03/06/22 0626    Narrative:      The following orders were created for panel order CBC & Differential.  Procedure                               Abnormality         Status                     ---------                               -----------         ------                     CBC Auto Differential[[id number]]        Abnormal            Final result               Scan Slide[[id number]]                                                                    Please view results for these tests on the individual orders.    CBC Auto Differential [[id number]]  (Abnormal) Collected: 03/06/22 0545    Specimen: Blood Updated: 03/06/22 0621     WBC 14.27 10*3/mm3      RBC 4.22 10*6/mm3      Hemoglobin 12.8 g/dL      Hematocrit 36.6 %      MCV 86.7 fL      MCH 30.3 pg      MCHC 35.0 g/dL      RDW 13.4 %      RDW-SD 42.5 fl      MPV 11.8 fL      Platelets 118 10*3/mm3      Neutrophil % 89.4 %      Lymphocyte % 3.2 %      Monocyte % 5.7 %      Eosinophil % 0.0 %      Basophil % 0.6 %      Immature Grans % 1.1 %      Neutrophils, Absolute 12.77 10*3/mm3       Lymphocytes, Absolute 0.46 10*3/mm3      Monocytes, Absolute 0.81 10*3/mm3      Eosinophils, Absolute 0.00 10*3/mm3      Basophils, Absolute 0.08 10*3/mm3      Immature Grans, Absolute 0.15 10*3/mm3      nRBC 0.0 /100 WBC     CK [[id number]]  (Abnormal) Collected: 03/04/22 0648    Specimen: Blood Updated: 03/04/22 0818     Creatine Kinase 3,811 U/L     Comprehensive Metabolic Panel [[id number]]  (Abnormal) Collected: 03/04/22 0648    Specimen: Blood Updated: 03/04/22 0807     Glucose 105 mg/dL      BUN 36 mg/dL      Creatinine 1.06 mg/dL      Sodium 134 mmol/L      Potassium 4.0 mmol/L      Comment: Slight hemolysis detected by analyzer. Results may be affected.        Chloride 102 mmol/L      CO2 17.0 mmol/L      Calcium 8.4 mg/dL      Total Protein 5.9 g/dL      Albumin 2.50 g/dL      ALT (SGPT) 42 U/L      AST (SGOT) 114 U/L      Alkaline Phosphatase 49 U/L      Total Bilirubin 0.5 mg/dL      Globulin 3.4 gm/dL      A/G Ratio 0.7 g/dL      BUN/Creatinine Ratio 34.0     Anion Gap 15.0 mmol/L      eGFR 72.3 mL/min/1.73      Comment: National Kidney Foundation and American Society of Nephrology (ASN) Task Force recommended calculation based on the Chronic Kidney Disease Epidemiology Collaboration (CKD-EPI) equation refit without adjustment for race.       Narrative:      GFR Normal >60  Chronic Kidney Disease <60  Kidney Failure <15      Phosphorus [[id number]]  (Normal) Collected: 03/04/22 0648    Specimen: Blood Updated: 03/04/22 0807     Phosphorus 2.5 mg/dL     Magnesium [[id number]]  (Normal) Collected: 03/04/22 0648    Specimen: Blood Updated: 03/04/22 0807     Magnesium 2.1 mg/dL     Troponin [[id number]]  (Normal) Collected: 03/04/22 0648    Specimen: Blood Updated: 03/04/22 0804     Troponin T <0.010 ng/mL     Narrative:      Troponin T Reference Range:  <= 0.03 ng/mL-   Negative for AMI  >0.03 ng/mL-     Abnormal for myocardial necrosis.  Clinicians would have to utilize clinical acumen, EKG, Troponin and  serial changes to determine if it is an Acute Myocardial Infarction or myocardial injury due to an underlying chronic condition.       Results may be falsely decreased if patient taking Biotin.      Blood Culture ID, PCR - Blood, Arm, Left [[id number]]  (Abnormal) Collected: 03/03/22 1323    Specimen: Blood from Arm, Left Updated: 03/04/22 0331     BCID, PCR Staph aureus. mecA/C and MREJ (methicillin resistance gene) NOT detected. Identification by BCID2 PCR    Narrative:      Infectious disease consultation is highly recommended to rule out distant foci of infection.    Troponin [[id number]]  (Normal) Collected: 03/04/22 0004    Specimen: Blood Updated: 03/04/22 0059     Troponin T <0.010 ng/mL     Narrative:      Troponin T Reference Range:  <= 0.03 ng/mL-   Negative for AMI  >0.03 ng/mL-     Abnormal for myocardial necrosis.  Clinicians would have to utilize clinical acumen, EKG, Troponin and serial changes to determine if it is an Acute Myocardial Infarction or myocardial injury due to an underlying chronic condition.       Results may be falsely decreased if patient taking Biotin.      Lactic Acid, Plasma [[id number]]  (Normal) Collected: 03/03/22 1323    Specimen: Blood Updated: 03/03/22 1350     Lactate 1.8 mmol/L     Urinalysis, Microscopic Only - Urine, Clean Catch [[id number]]  (Abnormal) Collected: 03/03/22 1043    Specimen: Urine, Clean Catch Updated: 03/03/22 1155     RBC, UA 31-50 /HPF      WBC, UA None Seen /HPF      Bacteria, UA None Seen /HPF      Squamous Epithelial Cells, UA 0-2 /HPF      Hyaline Casts, UA 0-2 /LPF      Granular Casts, UA 3-6 /LPF      Methodology Automated Microscopy    Extra Tubes [[id number]] Collected: 03/03/22 1031    Specimen: Blood, Venous Line Updated: 03/03/22 1145    Narrative:      The following orders were created for panel order Extra Tubes.  Procedure                               Abnormality         Status                     ---------                                -----------         ------                     Lavender Top[[id number]]                                     Final result               Gold Top - SST[[id number]]                                   Final result               Light Blue Top[[id number]]                                   Final result                 Please view results for these tests on the individual orders.    Lavender Top [[id number]] Collected: 03/03/22 1031    Specimen: Blood Updated: 03/03/22 1145     Extra Tube hold for add-on     Comment: Auto resulted       Gold Top - SST [[id number]] Collected: 03/03/22 1031    Specimen: Blood Updated: 03/03/22 1145     Extra Tube Hold for add-ons.     Comment: Auto resulted.       Light Blue Top [[id number]] Collected: 03/03/22 1031    Specimen: Blood Updated: 03/03/22 1145     Extra Tube hold for add-on     Comment: Auto resulted       Magnesium [[id number]]  (Normal) Collected: 03/03/22 1031    Specimen: Blood Updated: 03/03/22 1114     Magnesium 2.1 mg/dL     BNP [[id number]]  (Normal) Collected: 03/03/22 1031    Specimen: Blood Updated: 03/03/22 1112     proBNP 1,633.0 pg/mL     Narrative:      Among patients with dyspnea, NT-proBNP is highly sensitive for the detection of acute congestive heart failure. In addition NT-proBNP of <300 pg/ml effectively rules out acute congestive heart failure with 99% negative predictive value.    Results may be falsely decreased if patient taking Biotin.      COVID-19 and FLU A/B PCR - Swab, Nasopharynx [[id number]]  (Normal) Collected: 03/03/22 1032    Specimen: Swab from Nasopharynx Updated: 03/03/22 1110     COVID19 Not Detected     Influenza A PCR Not Detected     Influenza B PCR Not Detected    Narrative:      Fact sheet for providers: https://www.fda.gov/media/981825/download    Fact sheet for patients: https://www.fda.gov/media/552459/download    Test performed by PCR.    Urinalysis With Culture If Indicated - Urine, Clean Catch [[id number]]  (Abnormal) Collected: 03/03/22  1043    Specimen: Urine, Clean Catch Updated: 03/03/22 1056     Color, UA Dark Yellow     Appearance, UA Turbid     pH, UA <=5.0     Specific Gravity, UA 1.020     Glucose, UA Negative     Ketones, UA 40 mg/dL (2+)     Bilirubin, UA Small (1+)     Blood, UA Large (3+)     Protein,  mg/dL (2+)     Leuk Esterase, UA Trace     Nitrite, UA Negative     Urobilinogen, UA 1.0 E.U./dL        Imaging Results (Most Recent)     Procedure Component Value Units Date/Time    MRI Lumbar Spine With & Without Contrast [[id number]] Collected: 03/07/22 1704     Updated: 03/07/22 2308    Addenda:         ADDENDUM   ADDENDUM #1       Critical findings were communicated to  at 10:20 PM on  3/7/2022.    Electronically signed by:  Jonnie Nixon MD  3/7/2022 11:06 PM [facility]  Workstation: [id number]    Signed: 03/07/22 2306 by Jonnie Nixon MD    Narrative:      INDICATION: Low back pain, prior surgery, new symptoms, M62.82  Rhabdomyolysis J18.9 Pneumonia, unspecified organism Z74.09 Other  reduced mobility Z78.9 Other specified health status Z74.09 Other  reduced mobility    EXAMINATION: MRI - MR LUMBAR SPINE WITHOUT THEN WITH IV CONTRAST      TECHNIQUE:    Multiplanar and multisequence MR images of the lumbar spine.    IV Contrast Dosage and Agent: 17 mL of ProHance    COMPARISON: None.  ____________________________________________    FINDINGS:    VERTEBRAE: There is slight loss of vertebral body height at L3.  There is no evidence of acute fracture. There is mild bone marrow  edema within L2 on L3. No obvious osseous enhancement is seen  following contrast injection. Presence of transpedicular screws  and posterior rods does create some artifact.  VERTEBRAL ALIGNMENT: There is a mild retrolisthesis of L3 on L4.  There is no anterolisthesis. There is straightening of the normal  lumbar lordosis  CORD: Distal cord is normal. Conus medullaris is displaced  anteriorly due to epidural fluid collection. Within the upper  lumbar canal there  is an epidural fluid collection posteriorly on  the right. This measures 1.7 x 0.8 x 5.7 cm and extends from the  T12-L1 disc level to the L2-3 disc level. This has an enhancing  wall. Findings are concerning for epidural abscess.    L1/L2: There is significant canal narrowing due to the epidural  abscess. There is degenerative disc disease. Facet joints are  unremarkable    L2/L3: There is significant canal narrowing due to the epidural  fluid collection. There is fluid signal within the disc space  concerning for discitis. There is enhancement in the soft tissues  surrounding the L2-3 disc without evidence of psoas abscess.    L3/L4: There is fluid signal interspace concerning for discitis.  There are inflammatory changes in the soft tissues. There is a  small psoas fluid collection on the right with peripheral  enhancement measuring 1.8 cm. This is consistent with an abscess.    L4/L5: Disc space is unremarkable. Canal appears patent.  Multiloculated abscess is identified within the right psoas  muscle.    L5/S1: There is fluid in the disc space concerning for discitis.  Lumbar canal is patent. There is mild facet arthropathy.    SOFT TISSUES: As stated above there is bilateral psoas muscle  enhancement with right psoas abscess formation. Other  retroperitoneal structures are unremarkable      Impression:        1.  5.7 cm long epidural abscess within the posterior canal on  the right of midline extending from T12-L1 level through L2-L3.  2.  The epidural abscess results in significant canal stenosis at  L2-3 and L1-2.  3.  Findings consistent with discitis at L2-3, L3-4, and L5-S1  with adjacent enhancement of the psoas muscle multiloculated  abscess in the right psoas muscle    Electronically signed by:  Jonnie Nixon MD  3/7/2022 9:25 PM [facility]  Workstation: [id number]    CT Abdomen Pelvis With Contrast [[id number]] Collected: 03/07/22 1504     Updated: 03/07/22 1557    Narrative:        PROCEDURE: CT chest abdomen  and pelvis with contrast    REASON FOR EXAM: Pneumonia, effusion or abscess suspected, xray  done, M62.82 Rhabdomyolysis J18.9 Pneumonia, unspecified organism  Z74.09 Other reduced mobility Z78.9 Other specified health status  Z74.09 Other reduced mobility    This exam was performed according to our departmental  dose-optimization program, which includes automated exposure  control, adjustment of the mA and/or kV according to patient size  and/or use of iterative reconstruction technique.    FINDINGS: Axial computer tomography sequential imaging was  performed from the thoracic inlet through the symphysis pubis  after administration of IV contrast. .Sagittal and coronal  reformation was performed .    Ascending thoracic fusiform aneurysm which measures 4.33 x 4.51  cm in AP and transverse dimension. Otherwise mediastinal  structures of the chest are normal. There is no lymphadenopathy  or pericardial effusion. Just superior to the sternal manubrium  involving the anterior superior chest wall, there is an oval  mildly hyperdense fluid collection which has Hounsfield units of  28 and measures 2.07 x 1.79 x 1.44 cm. Mild centrilobular  emphysematous changes. Right upper lobe posterior segment and  right lower lobe posterior basilar segment peripheral coarse  interstitial stranding with honeycombing. The lungs are otherwise  clear. Pleural spaces are normal.    The liver is normal. The gallbladder is normal. The pancreas is  normal. The spleen is normal. Bilateral adrenal glands are  normal. Right kidney upper pole and lower pole small foci of  diminished renal parenchymal enhancement. Associated small amount  of right perirenal space fluid and fatty stranding is seen. The  right kidney and ureter are otherwise normal. Left kidney  perirenal space small amount of fatty stranding and fluid. The  left kidney and ureter are otherwise normal. The bladder and  prostate are normal. The hollow viscera is normal.  No  lymphadenopathy in the abdomen or the pelvis. No acute osseous  abnormality. Postsurgical changes of the lumbar spine with  bilateral posterior ihsan fusion of the L3 and L4 vertebral bodies  with satisfactory anatomic alignment. Postsurgical changes  consistent with L3-4 discectomy with bone graft placement within  the intervertebral disc space. Cortical screws seen fixating the  superior right L4-5 facet joint. L5 left transverse pedicle  screws in place. Right bipolar hip prostheses in place.  Postsurgical changes consistent with L4-5 discectomy with bone  graft placed within the intervertebral disc space. Small amount  of fluid in the presacral space of uncertain clinical  significance.      Impression:      1.Ascending thoracic fusiform aneurysm which measures 4.33 x 4.51  cm in AP and transverse dimension.   2.Just superior to the sternal manubrium involving the anterior  superior chest wall, there is an oval mildly hyperdense fluid  collection which has Hounsfield units of 28 and measures 2.07 x  1.79 x 1.44 cm. The differential for this lesion would include  possible posttraumatic hematoma/seroma versus atypical appearing  sebaceous cyst versus less likely abscess or cystic mass.  Recommend clinical correlation.  3. Mild centrilobular emphysematous changes.  4.Right upper lobe posterior segment and right lower lobe  posterior basilar segment peripheral coarse interstitial  stranding with honeycombing. This would be suspicious for  pulmonary fibrosis.  5.Right kidney upper pole and lower pole small foci of diminished  renal parenchymal enhancement. Associated small amount of right  perirenal space fluid and fatty stranding is seen. These findings  would be suspicious for changes from pyelonephritis.  6. Left kidney perirenal space small amount of fatty stranding  and fluid. This would be suspicious for inflammatory changes.  Recommend clinical correlation.  7. Postsurgical changes of the lumbar spine and  right hip  described above.  8. Small amount of fluid in the presacral space of uncertain  clinical significance.    Electronically signed by:  César Tran MD  3/7/2022 3:55 PM CST  Workstation: [id number]    CT Chest With Contrast Diagnostic [[id number]] Collected: 03/07/22 1504     Updated: 03/07/22 1557    Narrative:        PROCEDURE: CT chest abdomen and pelvis with contrast    REASON FOR EXAM: Pneumonia, effusion or abscess suspected, xray  done, M62.82 Rhabdomyolysis J18.9 Pneumonia, unspecified organism  Z74.09 Other reduced mobility Z78.9 Other specified health status  Z74.09 Other reduced mobility    This exam was performed according to our departmental  dose-optimization program, which includes automated exposure  control, adjustment of the mA and/or kV according to patient size  and/or use of iterative reconstruction technique.    FINDINGS: Axial computer tomography sequential imaging was  performed from the thoracic inlet through the symphysis pubis  after administration of IV contrast. .Sagittal and coronal  reformation was performed .    Ascending thoracic fusiform aneurysm which measures 4.33 x 4.51  cm in AP and transverse dimension. Otherwise mediastinal  structures of the chest are normal. There is no lymphadenopathy  or pericardial effusion. Just superior to the sternal manubrium  involving the anterior superior chest wall, there is an oval  mildly hyperdense fluid collection which has Hounsfield units of  28 and measures 2.07 x 1.79 x 1.44 cm. Mild centrilobular  emphysematous changes. Right upper lobe posterior segment and  right lower lobe posterior basilar segment peripheral coarse  interstitial stranding with honeycombing. The lungs are otherwise  clear. Pleural spaces are normal.    The liver is normal. The gallbladder is normal. The pancreas is  normal. The spleen is normal. Bilateral adrenal glands are  normal. Right kidney upper pole and lower pole small foci of  diminished renal  parenchymal enhancement. Associated small amount  of right perirenal space fluid and fatty stranding is seen. The  right kidney and ureter are otherwise normal. Left kidney  perirenal space small amount of fatty stranding and fluid. The  left kidney and ureter are otherwise normal. The bladder and  prostate are normal. The hollow viscera is normal. No  lymphadenopathy in the abdomen or the pelvis. No acute osseous  abnormality. Postsurgical changes of the lumbar spine with  bilateral posterior ihsan fusion of the L3 and L4 vertebral bodies  with satisfactory anatomic alignment. Postsurgical changes  consistent with L3-4 discectomy with bone graft placement within  the intervertebral disc space. Cortical screws seen fixating the  superior right L4-5 facet joint. L5 left transverse pedicle  screws in place. Right bipolar hip prostheses in place.  Postsurgical changes consistent with L4-5 discectomy with bone  graft placed within the intervertebral disc space. Small amount  of fluid in the presacral space of uncertain clinical  significance.      Impression:      1.Ascending thoracic fusiform aneurysm which measures 4.33 x 4.51  cm in AP and transverse dimension.   2.Just superior to the sternal manubrium involving the anterior  superior chest wall, there is an oval mildly hyperdense fluid  collection which has Hounsfield units of 28 and measures 2.07 x  1.79 x 1.44 cm. The differential for this lesion would include  possible posttraumatic hematoma/seroma versus atypical appearing  sebaceous cyst versus less likely abscess or cystic mass.  Recommend clinical correlation.  3. Mild centrilobular emphysematous changes.  4.Right upper lobe posterior segment and right lower lobe  posterior basilar segment peripheral coarse interstitial  stranding with honeycombing. This would be suspicious for  pulmonary fibrosis.  5.Right kidney upper pole and lower pole small foci of diminished  renal parenchymal enhancement. Associated  small amount of right  perirenal space fluid and fatty stranding is seen. These findings  would be suspicious for changes from pyelonephritis.  6. Left kidney perirenal space small amount of fatty stranding  and fluid. This would be suspicious for inflammatory changes.  Recommend clinical correlation.  7. Postsurgical changes of the lumbar spine and right hip  described above.  8. Small amount of fluid in the presacral space of uncertain  clinical significance.    Electronically signed by:  César Tran MD  3/7/2022 3:55 PM CST  Workstation: [id number]    MRI Brain Without Contrast [[id number]] Collected: 03/04/22 0925     Updated: 03/04/22 1230    Narrative:      MRI BRAIN WITHOUT IV CONTRAST    CLINICAL HISTORY:  76 years Male,Syncope, recurrent, M62.82  Rhabdomyolysis J18.9 Pneumonia, unspecified organism    COMPARISON: None    FINDINGS:  Axial, coronal, and sagittal images were obtained  without IV contrast.    T2/FLAIR hyperintense signal in the bilateral  periventricular/deep cerebral white matter is most compatible  with mild to moderate chronic small vessel ischemic change. No  abnormal restricted diffusion to indicate acute infarct. No mass  effect. Ventricular size is normal. No evidence for hemosiderin  deposition. The appropriate flow voids are noted about the skull  base.    The paranasal sinuses and mastoid air cells are clear.      Impression:      1. Mild/moderate chronic small vessel ischemic change.  2. No acute infarct.  3. No mass effect.  4. No hydrocephalus.    Electronically signed by:  Ronny Lee MD  3/4/2022 12:27 PM CST  Workstation: [id number]     Carotid Bilateral [[id number]] Collected: 03/04/22 0554     Updated: 03/04/22 0653    Narrative:      PROCEDURE: Bilateral carotid artery Doppler    COMPARISON: No comparison    HISTORY: Recurrent syncope    FINDINGS: Realtime grayscale and color flow images as well as  spectral waveform analysis is performed of the carotid arteries.    There is  mild atherosclerotic disease in the right bulb and  proximal internal carotid artery. There is mild-to-moderate  atherosclerotic disease in the left bulb and proximal internal  carotid artery.    Right: The peak systolic velocity in the right common carotid  artery is 61.2 cm/sec. The peak systolic velocity in the right  internal carotid artery is 109.2 cm/sec. The IC/CC ratio is 1.8.  Antegrade flow is present in the right vertebral artery.    Left: The peak systolic velocity in the left common carotid  artery is 61.1 cm/sec. The peak systolic velocity in the left  internal carotid artery is 196.8 cm/sec. The IC/CC ratio is 3.2.  Antegrade flow is present in the left vertebral artery.      Impression:      1. Mild right and mild-to-moderate left atherosclerotic disease  in the region of the bilateral bulb/internal carotid arteries.   2. No significant flow-limiting stenosis by velocity measurements  in the right ICA.  3. There is 50-69% of diameter narrowing in the left ICA.    Criteria For Estimating Internal Carotid Stenosis  ICA-PSV:  Normal------------------<125 cm/sec  <50%--------------------<125 cm/sec  50 - 69%---------------125-230 cm/sec  >70%-------------------->230 cm/sec  Near/Occlusion-------Variable  Total/Occlusion-------No Flow    ICA/CCA PSV Ratio:  Normal------------------<2.0  <50%--------------------<2.0  50 - 69%----------------2.0 - 4.0  >70%-------------------->4.0  Near/Occlusion--------Variable  Total/Occlusion--------No Flow    ICA - EDV:  Normal-------------------<40 cm/sec  <50%---------------------<40 cm/sec  50 - 69%-----------------40 - 100 cm/sec  >70%--------------------->100 cm/sec  Near/Occlusion---------Variable  Total/Occlusion---------No Flow    Kwaku EG, Jean CB, Roseanna GL, et al: Society of Radiologists in  Ultrasound Consensus Conference on Ultrasound and Doppler  Diagnosis of Carotid Stenosis. Radiology, Nov 2003.    Electronically signed by:  Damien Ahn MD  3/4/2022 6:50  AM CST  Workstation: [id number]    CT Lumbar Spine Without Contrast [[id number]] Collected: 03/03/22 1108     Updated: 03/03/22 1358    Narrative:      EXAM: CT LUMBAR SPINE WO CONTRAST    DATE: 3/3/2022 11:08 AM CST    TECHNIQUE: Axial images were obtained at 3 mm intervals through  the lumbar spine. Coronal and sagittal reformatted images were  obtained    This exam was performed according to our departmental  dose-optimization program, which includes automated exposure  control, adjustment of the mA and/or kV according to patient size  and/or use of iterative reconstruction technique.    CLINICAL INDICATION: lumbar pain, frequent falls    COMPARISON: Report from prior plain films of the lumbar spine  July 2000    FINDINGS: Postoperative changes are noted in the lower lumbar  spine with bilateral pedicle screws at the L3 and L4 level with a  pedicle screw on the left at L5 and a screw traversing the L4-5  facet on the right. There is no radiographic evidence of hardware  failure/loosening. The vertebral bodies demonstrate no evidence  of an acute fracture. There is mild retrolisthesis at L3-4.    There are disc spacers at the L3-4 and L4-5 levels with marked  disc space narrowing at L2-3 and mild disc space narrowing at  L5-S1. There is mild central canal stenosis at L2/3.    Ancillary findings include a left renal cyst and interstitial  fibrosis in the lung bases.      Impression:      1. Postoperative changes as above without radiographic evidence  of hardware failure.  2. No evidence of an acute fracture.  3. Mild central canal stenosis at L2-3.    Electronically signed by:  Amarilis Gray MD  3/3/2022 1:56 PM CST  Workstation: [id number]    XR Chest 1 View [[id number]] Collected: 03/03/22 1018     Updated: 03/03/22 1048    Narrative:        PORTABLE CHEST    HISTORY: Cough. Shortness of air.    Portable AP supine semierect upright film of the chest was  obtained at.  COMPARISON: None    FINDINGS:   EKG  leads.  Chronic obstructive pulmonary disease.  Minimal subsegmental infiltrates right midlung and right lung  base.  The heart is not enlarged.  The pulmonary vasculature is not increased.  No pleural effusion.  No pneumothorax.  No acute osseous abnormality.  Minimal dextroscoliosis thoracic spine.      Impression:      CONCLUSION:  Chronic obstructive pulmonary disease.  Minimal subsegmental infiltrates right midlung and right lung  base.    [id number]    Electronically signed by:  Rolo Velasco MD  3/3/2022 10:45 AM CST  Workstation: [id number]          Chief Complaint on Day of Discharge: Low back pain with inability to ambulate due to reduced strength in lower limbs    Hospital Course:  The patient is a 76 y.o. male who presented to [facility] with complaints of increased low back pain (in the context of chronic, with fusions, and multiple surgeries, including prior infections), urinary retention, reduced lower limb strength, and falls.  Admission the patient was found to be in acute acute rhabdomyolysis with associated COLIN and a mild transaminitis.  The patient responded well to fluid resuscitation, which included sodium bicarbonate early in the admission, with the CK and creatinine normalizing within 48-72 hours.    The patient had blood cultures taken in the emergency department, thankfully, and these revealed that the patient had a Staphylococcus aureus (MSSA) bacteremia.  This prompted an extensive work-up to find the source, which has resulted in the clinical team identifying a large epidural abscess, as well as discitis, and a psoas abscess.  The patient has been on IV cefazolin since the positive Blood cultures in the early hours of the 3/4/2022.  Despite this therapeutic regimen, the patient's white blood cell count has continued to climb despite early lactate being normal (no other infectious markers were trended during this admission).  Sensitivities for the blood cultures have demonstrated  "that it is sensitive to IV cefazolin and all other antibiotics tested (gentamicin, oxacillin, rifampin, and vancomycin).    Due to the results of the MRI that were resulted on the evening of 3/7/2022 I initiated call, through the transfer center, to [facility] where I am pleased to say Dr. Hansen, the hospitalist, has kindly accepted the patient for transfer for consideration of urgent intervention for source control and ongoing acute care management of this patient's infection.    Condition on Discharge:  stable    Physical Exam on Discharge:  /58 (BP Location: Left arm, Patient Position: Lying)   Pulse 93   Temp 97.8 °F (36.6 °C) (Oral)   Resp 18   Ht (P) 180.3 cm (70.98\")   Wt 78 kg (172 lb)   SpO2 95%   BMI (P) 24.00 kg/m²   Physical Exam  Constitutional:       General: He is not in acute distress.     Appearance: Normal appearance. He is not toxic-appearing or diaphoretic.   HENT:      Head: Normocephalic and atraumatic.      Nose: Nose normal. No congestion or rhinorrhea.      Mouth/Throat:      Mouth: Mucous membranes are moist.      Pharynx: Oropharynx is clear. No oropharyngeal exudate or posterior oropharyngeal erythema.   Eyes:      General: No scleral icterus.     Extraocular Movements: Extraocular movements intact.      Conjunctiva/sclera: Conjunctivae normal.      Pupils: Pupils are equal, round, and reactive to light.   Cardiovascular:      Rate and Rhythm: Normal rate and regular rhythm.      Pulses: Normal pulses.      Heart sounds: Normal heart sounds. No murmur heard.    No gallop.   Pulmonary:      Effort: Pulmonary effort is normal. No respiratory distress.      Breath sounds: No stridor. No wheezing, rhonchi or rales.   Abdominal:      General: Abdomen is flat. There is no distension.      Palpations: Abdomen is soft. There is no mass.      Tenderness: There is no abdominal tenderness.      Hernia: No hernia is present.   Musculoskeletal:         General: No swelling, " tenderness, deformity or signs of injury.   Skin:     Capillary Refill: Capillary refill takes less than 2 seconds.      Coloration: Skin is not jaundiced.      Findings: No bruising, erythema or rash.   Neurological:      Mental Status: He is alert and oriented to person, place, and time.      Cranial Nerves: No cranial nerve deficit.      Sensory: Sensory deficit present.      Motor: Weakness present.      Comments: Power:  -Hip flexors and extensors 3/5  -Knee flexors and extensors 4/5  -Ankle flexes and extensors 5/5    Sensation is reduced L4-S1   Psychiatric:         Mood and Affect: Mood normal.         Behavior: Behavior normal.         Thought Content: Thought content normal.         Judgment: Judgment normal.         Discharge Disposition: Transfer to [facility] for surgical consultation to facilitate urgent intervention for source control and optimal management  [facility])    Discharge Medications:     Discharge Medications      New Medications      Instructions Start Date   aluminum-magnesium hydroxide-simethicone 400-400-40 MG/5ML suspension  Commonly known as: MAALOX MAX   15 mL, Oral, Every 6 Hours PRN      calcium gluconate 1 g in sodium chloride 0.9 % 100 mL IVPB   1 g, Intravenous, Once      ceFAZolin in 0.9% normal saline 2 GM/ 100 mL solution IVPB  Commonly known as: ANCEF   2 g, Intravenous, Every 8 Hours      furosemide 10 MG/ML injection  Commonly known as: LASIX   20 mg, Intravenous, Daily      heparin (porcine) 5000 UNIT/ML injection   5,000 Units, Subcutaneous, Every 8 Hours Scheduled      ipratropium-albuterol 0.5-2.5 mg/3 ml nebulizer  Commonly known as: DUO-NEB   3 mL, Nebulization, Every 4 Hours PRN      melatonin 3 MG tablet   6 mg, Oral, Nightly PRN      nicotine 14 MG/24HR patch  Commonly known as: NICODERM CQ   1 patch, Transdermal, Every 24 Hours Scheduled      ondansetron 2 mg/mL injection  Commonly known as: ZOFRAN   4 mg, Intravenous, Every 6  Hours PRN      polyethylene glycol 17 g packet  Commonly known as: MIRALAX   17 g, Oral, Daily      sennosides-docusate 8.6-50 MG per tablet  Commonly known as: PERICOLACE   2 tablets, Oral, 2 Times Daily      tamsulosin 0.4 MG capsule 24 hr capsule  Commonly known as: FLOMAX   0.4 mg, Oral, Daily         Changes to Medications      Instructions Start Date   hydroxychloroquine 200 MG tablet  Commonly known as: PLAQUENIL  What changed:   · how much to take  · when to take this   400 mg, Oral, Every 24 Hours Scheduled      predniSONE 5 MG tablet  Commonly known as: DELTASONE  What changed:   · medication strength  · when to take this  · additional instructions   5 mg, Oral, Daily With Breakfast         Continue These Medications      Instructions Start Date   aspirin 325 MG tablet   325 mg, Oral, Daily      HYDROcodone-acetaminophen 7.5-325 MG per tablet  Commonly known as: NORCO   1 tablet, Oral, Every 4 Hours PRN      leflunomide 10 MG tablet  Commonly known as: ARAVA   20 mg, Oral, Daily      naproxen sodium 220 MG tablet  Commonly known as: ALEVE   220 mg, Oral             Discharge Diet: NPO    Activity at Discharge: Transfer only with assistance    Discharge Care Plan/Instructions: Transfer to [facility]    Follow-up Appointments:   No future appointments.    Test Results Pending at Discharge:   Pending Labs     Order Current Status    Blood Culture - Blood, Arm, Right In process    Blood Culture - Blood, Hand, Right In process            Manuel Jim MD    Time: 35 minutes of dedicated clinical time

## 2022-03-08 NOTE — NURSING NOTE
Received massage from [facility] of transfer per Dr Jim order. Spoke with the patient Mr.William Martin after speaking with his son Dejuan Martin-Mr Martin is agreeable to transfer to [facility] for further treatment with a neurologist.

## 2022-04-04 PROBLEM — R78.81 MSSA BACTEREMIA: Status: ACTIVE | Noted: 2022-01-01

## 2022-04-04 PROBLEM — B95.61 MSSA BACTEREMIA: Status: ACTIVE | Noted: 2022-01-01

## 2022-04-18 NOTE — NURSING NOTE
Spoke with pts son. Let him know that some of the lab work was hemolyzed and needed to re collected. Instructed them to go to the clinic lab because patient would need to be stuck peripherally to obtain new samples. He states understanding.

## 2022-04-25 NOTE — NURSING NOTE
Pt came in for labs and picc dressing change today, upon speaking with him, pt states he has been having pain on and off in his right upper arm, right upper arm is swollen with a small knot on the back of his arm.  Pt states he already has a blood clot in his right foot.  Spoke with our nurse practitioner, who has ordered an ultrasound of the right arm  Pt understands and we also spoke with his son who is waiting for him in the lobby, who also understands and is agreeable to the ultrasound today